# Patient Record
Sex: MALE | Race: BLACK OR AFRICAN AMERICAN | NOT HISPANIC OR LATINO | Employment: FULL TIME | URBAN - METROPOLITAN AREA
[De-identification: names, ages, dates, MRNs, and addresses within clinical notes are randomized per-mention and may not be internally consistent; named-entity substitution may affect disease eponyms.]

---

## 2023-03-07 ENCOUNTER — OFFICE VISIT (OUTPATIENT)
Dept: URGENT CARE | Facility: URGENT CARE | Age: 38
End: 2023-03-07
Payer: COMMERCIAL

## 2023-03-07 VITALS
TEMPERATURE: 98.2 F | OXYGEN SATURATION: 100 % | WEIGHT: 228.5 LBS | HEART RATE: 80 BPM | DIASTOLIC BLOOD PRESSURE: 115 MMHG | SYSTOLIC BLOOD PRESSURE: 192 MMHG

## 2023-03-07 DIAGNOSIS — R59.0 AXILLARY LYMPHADENOPATHY: Primary | ICD-10-CM

## 2023-03-07 DIAGNOSIS — R03.0 ELEVATED BLOOD PRESSURE READING WITHOUT DIAGNOSIS OF HYPERTENSION: ICD-10-CM

## 2023-03-07 DIAGNOSIS — R59.0 INGUINAL LYMPHADENOPATHY: ICD-10-CM

## 2023-03-07 PROCEDURE — 99204 OFFICE O/P NEW MOD 45 MIN: CPT | Performed by: PHYSICIAN ASSISTANT

## 2023-03-07 RX ORDER — AMLODIPINE BESYLATE 5 MG/1
5 TABLET ORAL DAILY
Qty: 30 TABLET | Refills: 0 | Status: SHIPPED | OUTPATIENT
Start: 2023-03-07 | End: 2023-03-10 | Stop reason: DRUGHIGH

## 2023-03-07 RX ORDER — CEPHALEXIN 500 MG/1
500 CAPSULE ORAL 4 TIMES DAILY
Qty: 28 CAPSULE | Refills: 0 | Status: SHIPPED | OUTPATIENT
Start: 2023-03-07 | End: 2023-03-14

## 2023-03-07 ASSESSMENT — ENCOUNTER SYMPTOMS
FATIGUE: 0
HEMATURIA: 0
CHILLS: 0
SHORTNESS OF BREATH: 0
RESPIRATORY NEGATIVE: 1
DYSURIA: 0
BRUISES/BLEEDS EASILY: 0
RHINORRHEA: 0
CARDIOVASCULAR NEGATIVE: 1
SINUS PRESSURE: 0
WHEEZING: 0
PALPITATIONS: 0
FREQUENCY: 0
ADENOPATHY: 1
SORE THROAT: 0
GASTROINTESTINAL NEGATIVE: 1
WOUND: 0
FEVER: 0
COLOR CHANGE: 0
SINUS PAIN: 0
COUGH: 0
CHEST TIGHTNESS: 0

## 2023-03-08 NOTE — PROGRESS NOTES
Agustin Serrano is a 37 year old, presenting for the following health issues:  lump (Pt has a lump under right arm pain, some time is can be painful, noticed it in Dec when he was positive for influenza A, lump come and go, also has one on groin area a small lump)    HPI   Concern - lump in his armpit and groin for the past 3months  Onset: 3months  Description:  Noticed lumps in his R armpit and L groin region for the past 3months after having taken tamiflu for influenza.  They seem to come and go and can be tender at times.    Intensity: mild  Progression of Symptoms:  waxing and waning  Accompanying Signs & Symptoms:  No redness, drainage or fevers.  No dysuria, urinary frequency, urgency or hematuria.  No penile discharge, lesions or testicular pain.  Previous history of similar problem: none  Precipitating factors:        Worsened by: none  Alleviating factors:        Improved by: none  Therapies tried and outcome: tylenol, motrin with minimal relief    There is no problem list on file for this patient.    No current outpatient medications on file.     No current facility-administered medications for this visit.      No Known Allergies    Review of Systems   Constitutional: Negative for chills, fatigue and fever.   HENT: Negative for congestion, ear discharge, ear pain, hearing loss, rhinorrhea, sinus pressure, sinus pain and sore throat.    Respiratory: Negative.  Negative for cough, chest tightness, shortness of breath and wheezing.    Cardiovascular: Negative.  Negative for chest pain, palpitations and peripheral edema.   Gastrointestinal: Negative.    Genitourinary: Negative for dysuria, frequency, hematuria, penile discharge and urgency.   Skin: Negative.  Negative for color change, pallor, rash and wound.   Hematological: Positive for adenopathy. Does not bruise/bleed easily.   All other systems reviewed and are negative.           Objective    BP (!) 192/115 (BP Location: Right arm, Patient Position:  Sitting, Cuff Size: Adult Regular)   Pulse 80   Temp 98.2  F (36.8  C) (Tympanic)   Wt 103.6 kg (228 lb 8 oz)   SpO2 100%   There is no height or weight on file to calculate BMI.  Physical Exam  Vitals and nursing note reviewed.   Constitutional:       General: He is not in acute distress.     Appearance: Normal appearance. He is normal weight. He is not ill-appearing.   HENT:      Head: Normocephalic and atraumatic.      Ears:      Comments: TMs are intact without any erythema or bulging bilaterally.  Airway is patent.     Nose: Nose normal.      Mouth/Throat:      Lips: Pink.      Mouth: Mucous membranes are moist.      Pharynx: Oropharynx is clear. Uvula midline. No pharyngeal swelling, oropharyngeal exudate, posterior oropharyngeal erythema or uvula swelling.      Tonsils: No tonsillar exudate or tonsillar abscesses.   Eyes:      General: No scleral icterus.     Conjunctiva/sclera: Conjunctivae normal.      Pupils: Pupils are equal, round, and reactive to light.   Neck:      Thyroid: No thyromegaly.   Cardiovascular:      Rate and Rhythm: Normal rate and regular rhythm.      Pulses: Normal pulses.      Heart sounds: Normal heart sounds, S1 normal and S2 normal. No murmur heard.    No friction rub. No gallop.   Pulmonary:      Effort: Pulmonary effort is normal. No tachypnea, accessory muscle usage, respiratory distress or retractions.      Breath sounds: Normal breath sounds and air entry. No stridor. No decreased breath sounds, wheezing, rhonchi or rales.   Musculoskeletal:      Cervical back: Normal range of motion and neck supple.   Lymphadenopathy:      Head:      Right side of head: No tonsillar adenopathy.      Left side of head: No tonsillar adenopathy.      Cervical: No cervical adenopathy.      Upper Body:      Right upper body: Axillary adenopathy present. No supraclavicular or pectoral adenopathy.      Left upper body: No supraclavicular, axillary or pectoral adenopathy.      Lower Body: No right  inguinal adenopathy. Left inguinal adenopathy present.   Skin:     General: Skin is warm and dry.      Findings: No rash.   Neurological:      Mental Status: He is alert and oriented to person, place, and time.   Psychiatric:         Mood and Affect: Mood normal.         Behavior: Behavior normal.         Thought Content: Thought content normal.         Judgment: Judgment normal.          Assessment/Plan:  Axillary lymphadenopathy:  ?infectious vs cyst vs cancer.  Will start keflex [id number] and send for US at the [facility].  Will send [facility] provider message.  Tylenol/ibuprofen as needed for pain.  Recheck in clinic if symptoms worsen or if symptoms do not improve.  -     Referral to Acute and Diagnostic Services (Day of diagnostic / First order acute)  -     cephALEXin (KEFLEX) 500 MG capsule; Take 1 capsule (500 mg) by mouth 4 times daily for 7 days    Inguinal lymphadenopathy:  Same treatment as above.  -     Referral to Acute and Diagnostic Services (Day of diagnostic / First order acute)  -     cephALEXin (KEFLEX) 500 MG capsule; Take 1 capsule (500 mg) by mouth 4 times daily for 7 days    Elevated blood pressure reading without diagnosis of hypertension:  He states that this has been present for a long time but was never started on BP meds.  He is asymptomatic at this time.  Will start amlodipine and recheck with his PCP in 1-2weeks.  -     amLODIPine (NORVASC) 5 MG tablet; Take 1 tablet (5 mg) by mouth daily        Jessica Cruz PA-C

## 2023-03-10 ENCOUNTER — OFFICE VISIT (OUTPATIENT)
Dept: PEDIATRICS | Facility: CLINIC | Age: 38
End: 2023-03-10
Attending: PHYSICIAN ASSISTANT
Payer: COMMERCIAL

## 2023-03-10 ENCOUNTER — ANCILLARY PROCEDURE (OUTPATIENT)
Dept: ULTRASOUND IMAGING | Facility: CLINIC | Age: 38
End: 2023-03-10
Attending: NURSE PRACTITIONER
Payer: COMMERCIAL

## 2023-03-10 VITALS
WEIGHT: 228 LBS | SYSTOLIC BLOOD PRESSURE: 177 MMHG | TEMPERATURE: 97.1 F | OXYGEN SATURATION: 100 % | HEART RATE: 96 BPM | RESPIRATION RATE: 20 BRPM | DIASTOLIC BLOOD PRESSURE: 102 MMHG

## 2023-03-10 DIAGNOSIS — E11.9 TYPE 2 DIABETES MELLITUS WITHOUT COMPLICATION, WITHOUT LONG-TERM CURRENT USE OF INSULIN (H): Primary | ICD-10-CM

## 2023-03-10 DIAGNOSIS — Z83.3 FAMILY HISTORY OF DIABETES MELLITUS: ICD-10-CM

## 2023-03-10 DIAGNOSIS — R79.89 ELEVATED TSH: ICD-10-CM

## 2023-03-10 DIAGNOSIS — I10 BENIGN ESSENTIAL HYPERTENSION: ICD-10-CM

## 2023-03-10 DIAGNOSIS — R22.31 AXILLARY LUMP, RIGHT: ICD-10-CM

## 2023-03-10 DIAGNOSIS — R19.09 LUMP IN THE GROIN: ICD-10-CM

## 2023-03-10 DIAGNOSIS — R51.9 ACUTE NONINTRACTABLE HEADACHE, UNSPECIFIED HEADACHE TYPE: ICD-10-CM

## 2023-03-10 LAB
ALBUMIN SERPL-MCNC: 4.1 G/DL (ref 3.4–5)
ALP SERPL-CCNC: 99 U/L (ref 40–150)
ALT SERPL W P-5'-P-CCNC: 54 U/L (ref 0–70)
ANION GAP SERPL CALCULATED.3IONS-SCNC: 7 MMOL/L (ref 3–14)
AST SERPL W P-5'-P-CCNC: 26 U/L (ref 0–45)
BASOPHILS # BLD AUTO: 0 10E3/UL (ref 0–0.2)
BASOPHILS NFR BLD AUTO: 0 %
BILIRUB SERPL-MCNC: 0.3 MG/DL (ref 0.2–1.3)
BUN SERPL-MCNC: 15 MG/DL (ref 7–30)
CALCIUM SERPL-MCNC: 9.8 MG/DL (ref 8.5–10.1)
CHLORIDE BLD-SCNC: 102 MMOL/L (ref 94–109)
CO2 SERPL-SCNC: 25 MMOL/L (ref 20–32)
CREAT SERPL-MCNC: 0.82 MG/DL (ref 0.66–1.25)
EOSINOPHIL # BLD AUTO: 0 10E3/UL (ref 0–0.7)
EOSINOPHIL NFR BLD AUTO: 1 %
ERYTHROCYTE [DISTWIDTH] IN BLOOD BY AUTOMATED COUNT: 12.3 % (ref 10–15)
ERYTHROCYTE [SEDIMENTATION RATE] IN BLOOD BY WESTERGREN METHOD: 6 MM/HR (ref 0–15)
GFR SERPL CREATININE-BSD FRML MDRD: >90 ML/MIN/1.73M2
GLUCOSE BLD-MCNC: 421 MG/DL (ref 70–99)
HBA1C MFR BLD: 13.8 % (ref 0–5.6)
HCT VFR BLD AUTO: 45.5 % (ref 40–53)
HGB BLD-MCNC: 15.6 G/DL (ref 13.3–17.7)
IMM GRANULOCYTES # BLD: 0 10E3/UL
IMM GRANULOCYTES NFR BLD: 0 %
LYMPHOCYTES # BLD AUTO: 1.9 10E3/UL (ref 0.8–5.3)
LYMPHOCYTES NFR BLD AUTO: 47 %
MCH RBC QN AUTO: 28.4 PG (ref 26.5–33)
MCHC RBC AUTO-ENTMCNC: 34.3 G/DL (ref 31.5–36.5)
MCV RBC AUTO: 83 FL (ref 78–100)
MONOCYTES # BLD AUTO: 0.3 10E3/UL (ref 0–1.3)
MONOCYTES NFR BLD AUTO: 7 %
NEUTROPHILS # BLD AUTO: 1.8 10E3/UL (ref 1.6–8.3)
NEUTROPHILS NFR BLD AUTO: 45 %
NRBC # BLD AUTO: 0 10E3/UL
NRBC BLD AUTO-RTO: 0 /100
PLATELET # BLD AUTO: 167 10E3/UL (ref 150–450)
POTASSIUM BLD-SCNC: 3.9 MMOL/L (ref 3.4–5.3)
PROT SERPL-MCNC: 8.1 G/DL (ref 6.8–8.8)
RBC # BLD AUTO: 5.49 10E6/UL (ref 4.4–5.9)
SODIUM SERPL-SCNC: 134 MMOL/L (ref 133–144)
T4 FREE SERPL-MCNC: 0.9 NG/DL (ref 0.76–1.46)
TSH SERPL DL<=0.005 MIU/L-ACNC: 5.02 MU/L (ref 0.4–4)
WBC # BLD AUTO: 4.1 10E3/UL (ref 4–11)

## 2023-03-10 PROCEDURE — 80050 GENERAL HEALTH PANEL: CPT

## 2023-03-10 PROCEDURE — 84439 ASSAY OF FREE THYROXINE: CPT

## 2023-03-10 PROCEDURE — 85652 RBC SED RATE AUTOMATED: CPT

## 2023-03-10 PROCEDURE — 36415 COLL VENOUS BLD VENIPUNCTURE: CPT

## 2023-03-10 PROCEDURE — 99215 OFFICE O/P EST HI 40 MIN: CPT | Performed by: NURSE PRACTITIONER

## 2023-03-10 PROCEDURE — 76882 US LMTD JT/FCL EVL NVASC XTR: CPT | Mod: LT | Performed by: RADIOLOGY

## 2023-03-10 PROCEDURE — 83036 HEMOGLOBIN GLYCOSYLATED A1C: CPT

## 2023-03-10 RX ORDER — METFORMIN HCL 500 MG
500 TABLET, EXTENDED RELEASE 24 HR ORAL
Qty: 30 TABLET | Refills: 0 | Status: SHIPPED | OUTPATIENT
Start: 2023-03-10 | End: 2023-03-25

## 2023-03-10 RX ORDER — AMLODIPINE BESYLATE 10 MG/1
10 TABLET ORAL DAILY
Qty: 30 TABLET | Refills: 0 | Status: SHIPPED | OUTPATIENT
Start: 2023-03-10 | End: 2023-03-25

## 2023-03-10 ASSESSMENT — PAIN SCALES - GENERAL: PAINLEVEL: NO PAIN (0)

## 2023-03-10 NOTE — PATIENT INSTRUCTIONS
Close follow up with primary care provider for establishing care and new diagnosis of hypertension and diabetes.   Also will need to schedule ultra sound for right axillary lump through primary care. See attached handouts related to diabetes and hypertension.     Increasing dose of amlodipine from 5 mg to 10 mg for hypertension   Start Metformin  mg as directed for diabetes management. Along with lifestyle changes see attached.     Diabetic educator will contact you for education.     Monitor blood pressure goal is < 140/90

## 2023-03-10 NOTE — PROGRESS NOTES
Assessment & Plan     Type 2 diabetes mellitus without complication, without long-term current use of insulin (H)  Blood glucose today is 421 with a HbA1c of 13.8. Discussed with patient lifestyle changes and starting medication for diabetes. Will start metformin as patient adamantly declining insulin at this time. Referred to diabetic educator for new diagnosis of diabetes. Appointment made to establish care with a primary provider for further workup and diabetes management.  - [facility] Adult Diabetes Educator Referral; Future  - amLODIPine (NORVASC) 10 MG tablet; Take 1 tablet (10 mg) by mouth daily for 30 days  - metFORMIN (GLUCOPHAGE XR) 500 MG 24 hr tablet; Take 1 tablet (500 mg) by mouth daily (with dinner) for 30 days    Benign essential hypertension  Patient started on low dose of amlodipine 3 days ago. Will increase dose at this time as blood pressure noted to still be considerably elevated. Advised patient to monitor blood pressure daily with a goal of < 140/90. Patient states he has ordered a blood pressure monitor for home will monitor and keep journal of readings as well as heart rate to bring into primary care appointment  - Erythrocyte sedimentation rate auto  - TSH with free T4 reflex  - Comprehensive metabolic panel  - T4 free  - amLODIPine (NORVASC) 10 MG tablet; Take 1 tablet (10 mg) by mouth daily for 30 days    Axillary lump, right  Likely cutaneous cyst. Differential diagnosis includes abscess (unlikely as there is no fluctuance, warmth or erythema and very minimal tenderness),  lipoma, sebaceous cyst, follicle or sweat gland plugged or and metastatic skin lesion. Due to length of time and no change in presentation of axillary lump feel it is appropriate for patient to schedule ultrasound through primary care. Unable to schedule US today through [facility] as US team did not have available opening and patient did not meet criteria for abscess.   - CBC with platelets differential  - Erythrocyte  sedimentation rate auto  - TSH with free T4 reflex  - Comprehensive metabolic panel  - T4 free    Lump in the groin  Probable cystic skin lesion per US. A cyst may resolve on its own. Follow up with primary care if it becomes irritated, red, or swollen.  - CBC with platelets differential  - Erythrocyte sedimentation rate auto  - TSH with free T4 reflex  - Comprehensive metabolic panel  - US Lower Extremity Non Vascular Left  - T4 free    Elevated TSH  Free T4 is WNL. Monitor for symptoms such as fatigue, weight gain, cold sensitivity, dry scaly skin, brittle hair and nails which could indicate progression to hypothyroidism. Recommend TSH recheck  in 6 months.    Acute nonintractable headache, unspecified headache type  Tension headache vs migraine. Recommend supportive care with Tylenol and/or ibuprofen, rest, and hydration.  - CBC with platelets differential  - Erythrocyte sedimentation rate auto  - TSH with free T4 reflex  - Comprehensive metabolic panel  - T4 free    Family history of diabetes mellitus  - A1c (Hemoglobin A1c)    Discussed symptoms that would warrant emergent evaluation.     55 minutes spent on the date of the encounter doing chart review, review of outside records, review of test results, interpretation of tests, patient visit, documentation and discussion with other provider(s)       Vera Jiang, Student Nurse Practitioner      I saw and evaluated the patient and agree with the findings and plan of care as documented in the note.    VALENTINA [facility]   Zach is a 37 year old, presenting for the following health issues:  Mass      HPI     Concern - groin/r arm lumps  Onset: December 2022  Description: axillary/inguinal mass, palpable mass, become painful when touched  Intensity: mild  Progression of Symptoms:  same  Accompanying Signs & Symptoms: none  Previous history of similar problem: none  Precipitating factors:        Worsened by:  touching causes some pain  Alleviating factors:        Improved by: none  Therapies tried and outcome: Antibiotic - no change    Reports first noticing the right axillary lump and left inguinal lump at the same time around Ghada 2022 shortly after having been diagnosed with influenza. They have not changed in size. He states the right axillary lump is sometimes minimally tender to touch, not associated with other symptoms. Left inguinal lump is nontender. He denies fever, chills, and night sweats. Reports mild right-sided headache the last 2 days.     Patient states he is a non-smoker and rarely drinks alcohol. He would usually drink 4 cups of caffeineated tea daily but has not had any since starting blood pressure medication 3/7/2023. His diet consists mainly of carbs, such as rice, and meat. He works from home as a  for [employer] and is primarily sedentary. He is more active in the summer and plays weekly football.    Notes family history of diabetes father    Review of Systems   All other systems reviewed and are negative.     Constitutional, HEENT, cardiovascular, pulmonary, gi and gu systems are negative, except as otherwise noted.      Objective    BP (!) 177/102 (BP Location: Right arm, Patient Position: Sitting, Cuff Size: Adult Regular)   Pulse 96   Temp 97.1  F (36.2  C) (Oral)   Resp 20   Wt 103.4 kg (228 lb)   SpO2 100%   There is no height or weight on file to calculate BMI.  Physical Exam   GENERAL: healthy, alert and no distress  EYES: Eyes grossly normal to inspection, EOMI and fundi benign-no diabetic or hypertensive changes seen  NECK: no adenopathy and no asymmetry, masses, or scars  RESP: lungs clear to auscultation - no rales, rhonchi or wheezes  CV: regular rate and rhythm, normal S1 S2, no S3 or S4, no murmur, click or rub, no peripheral edema and peripheral pulses strong  ABDOMEN: soft, nontender, no hepatosplenomegaly, no masses  SKIN: axillary: right axillary  fossa lump approximately 1cm x 1cm, smooth, firm, mobile, mildly tender, no erythema or edema  Inguinal:  left inguinal groove lump approximately 1cm x 0.5cm, firm, mobile, nontender, no erythema or edema  MS: no gross musculoskeletal defects noted, no edema    Results for orders placed or performed in visit on 03/10/23   US Lower Extremity Non Vascular Left     Status: None    Narrative    EXAMINATION: US LOWER EXTREMITY NON VASCULAR LEFT, 3/10/2023 1:06 PM    COMPARISON: None.    HISTORY: Left groin lump for 10 weeks.  left inguinal lump; Lump in the groin    FINDINGS: Ultrasound findings demonstrate a cystic structure in the  subcutaneous fat with possible connection to the skin. It measures 0.7  x 0.9 x 0.4 cm. No internal blood flow. The patient denies injury to  the area.       Impression    IMPRESSION:  1.  The left groin lump corresponds to a possible skin lesion.  Clinical management recommended.    TOMASZ NY MD         SYSTEM ID:  [id number]   A1c (Hemoglobin A1c)     Status: Abnormal   Result Value Ref Range    Hemoglobin A1C 13.8 (H) 0.0 - 5.6 %    Narrative    Results Checked   Erythrocyte sedimentation rate auto     Status: Normal   Result Value Ref Range    Erythrocyte Sedimentation Rate 6 0 - 15 mm/hr   TSH with free T4 reflex     Status: Abnormal   Result Value Ref Range    TSH 5.02 (H) 0.40 - 4.00 mU/L   Comprehensive metabolic panel     Status: Abnormal   Result Value Ref Range    Sodium 134 133 - 144 mmol/L    Potassium 3.9 3.4 - 5.3 mmol/L    Chloride 102 94 - 109 mmol/L    Carbon Dioxide (CO2) 25 20 - 32 mmol/L    Anion Gap 7 3 - 14 mmol/L    Urea Nitrogen 15 7 - 30 mg/dL    Creatinine 0.82 0.66 - 1.25 mg/dL    Calcium 9.8 8.5 - 10.1 mg/dL    Glucose 421 (HH) 70 - 99 mg/dL    Alkaline Phosphatase 99 40 - 150 U/L    AST 26 0 - 45 U/L    ALT 54 0 - 70 U/L    Protein Total 8.1 6.8 - 8.8 g/dL    Albumin 4.1 3.4 - 5.0 g/dL    Bilirubin Total 0.3 0.2 - 1.3 mg/dL    GFR Estimate >90 >60 mL/min/1.73m2     Narrative    Critical result, provider not notified due to previous critical result notification.     CBC with platelets and differential     Status: None   Result Value Ref Range    WBC Count 4.1 4.0 - 11.0 10e3/uL    RBC Count 5.49 4.40 - 5.90 10e6/uL    Hemoglobin 15.6 13.3 - 17.7 g/dL    Hematocrit 45.5 40.0 - 53.0 %    MCV 83 78 - 100 fL    MCH 28.4 26.5 - 33.0 pg    MCHC 34.3 31.5 - 36.5 g/dL    RDW 12.3 10.0 - 15.0 %    Platelet Count 167 150 - 450 10e3/uL    % Neutrophils 45 %    % Lymphocytes 47 %    % Monocytes 7 %    % Eosinophils 1 %    % Basophils 0 %    % Immature Granulocytes 0 %    NRBCs per 100 WBC 0 <1 /100    Absolute Neutrophils 1.8 1.6 - 8.3 10e3/uL    Absolute Lymphocytes 1.9 0.8 - 5.3 10e3/uL    Absolute Monocytes 0.3 0.0 - 1.3 10e3/uL    Absolute Eosinophils 0.0 0.0 - 0.7 10e3/uL    Absolute Basophils 0.0 0.0 - 0.2 10e3/uL    Absolute Immature Granulocytes 0.0 <=0.4 10e3/uL    Absolute NRBCs 0.0 10e3/uL   T4 free     Status: Normal   Result Value Ref Range    Free T4 0.90 0.76 - 1.46 ng/dL   CBC with platelets differential     Status: None    Narrative    The following orders were created for panel order CBC with platelets differential.  Procedure                               Abnormality         Status                     ---------                               -----------         ------                     CBC with platelets and d...[[id number]]                      Final result                 Please view results for these tests on the individual orders.

## 2023-03-10 NOTE — RESULT ENCOUNTER NOTE
Results discussed with patient in clinic. States understanding of these results.    Selina Vazquez CNP

## 2023-03-25 ENCOUNTER — OFFICE VISIT (OUTPATIENT)
Dept: URGENT CARE | Facility: URGENT CARE | Age: 38
End: 2023-03-25
Payer: COMMERCIAL

## 2023-03-25 VITALS
TEMPERATURE: 98.1 F | HEART RATE: 80 BPM | SYSTOLIC BLOOD PRESSURE: 160 MMHG | WEIGHT: 225 LBS | DIASTOLIC BLOOD PRESSURE: 104 MMHG | OXYGEN SATURATION: 98 %

## 2023-03-25 DIAGNOSIS — I10 UNCONTROLLED HYPERTENSION: Primary | ICD-10-CM

## 2023-03-25 DIAGNOSIS — E11.65 TYPE 2 DIABETES MELLITUS WITH HYPERGLYCEMIA, WITHOUT LONG-TERM CURRENT USE OF INSULIN (H): ICD-10-CM

## 2023-03-25 PROCEDURE — 99214 OFFICE O/P EST MOD 30 MIN: CPT | Performed by: STUDENT IN AN ORGANIZED HEALTH CARE EDUCATION/TRAINING PROGRAM

## 2023-03-25 RX ORDER — LANCETS
EACH MISCELLANEOUS
Qty: 100 EACH | Refills: 0 | Status: SHIPPED | OUTPATIENT
Start: 2023-03-25 | End: 2023-08-18

## 2023-03-25 RX ORDER — AMLODIPINE BESYLATE 10 MG/1
10 TABLET ORAL DAILY
Qty: 30 TABLET | Refills: 0 | Status: SHIPPED | OUTPATIENT
Start: 2023-03-25 | End: 2023-05-26

## 2023-03-25 RX ORDER — METFORMIN HCL 500 MG
500 TABLET, EXTENDED RELEASE 24 HR ORAL
Qty: 30 TABLET | Refills: 0 | Status: SHIPPED | OUTPATIENT
Start: 2023-03-25 | End: 2023-06-09

## 2023-03-25 RX ORDER — GLUCOSAMINE HCL/CHONDROITIN SU 500-400 MG
CAPSULE ORAL
Qty: 100 EACH | Refills: 3 | Status: SHIPPED | OUTPATIENT
Start: 2023-03-25

## 2023-03-25 NOTE — PROGRESS NOTES
Assessment & Plan     Uncontrolled hypertension  Patient presents with recheck of blood pressure. He has FP appointment on 5/4 to establish care. He has been taking amlodipine for the past 3 weeks, 1st week was on 5 mg then increased to 10 mg. His SBP home readings have improved from 200 to 140-160. DBPs are in the 90s at home. He has been exercising every day and has cut down on carbs for new diagnosis of diabetes. He has not reduced salt in his diet so we discussed that today to help reduce blood pressure. I recommended adding hydrochlorothiazide but he is hesitant to add more medication and states he wants to try salt reduction first. He admits he is anxious about being dependent on BP medication for life. We discussed that there is a lot of times a genetic component to hypertension that cannot be modified and to do diet and exercise changes to lifestyle and if BP is still elevated will need to add diuretic. He is traveling to [place] in mid-April so I sent in a refill of amlodipine. Advised to return to  to recheck BP in 2 weeks if home BPs are not getting closer to 120-130s/80s-low 90s. He also mentions that he feels like his muscle get tight sometimes at suppertime and it is hard to take in a deep breath so he will go on a walk and then he will feel the tension improve and he can take a deep breath. He is concerned it is a side effect of amlodipine but he takes that in the morning. It sounds like anxiety symptoms and he states it may be. He has no chest pain or shortness of breath with activity or exercise. Advised to continue to keep an eye on it and see if there are any triggers at suppertime that would cause anxiety.   - amLODIPine (NORVASC) 10 MG tablet  Dispense: 30 tablet; Refill: 0    Type 2 diabetes mellitus with hyperglycemia, without long-term current use of insulin (H)  Has started cutting down on carbs and exercising daily. He is taking metformin 500 mg once daily, having a little tummy ache from  it. I advised checking blood sugars to give him more feedback about his glucose control. He states he is feeling a lot better since making lifestyle and diet changes and is no longer having excessive thirst or urination. Advised to continue to exercise daily and reduce carb intake. Also advised to follow up with scheduled FP visit to recheck diabetes.   - blood glucose monitoring (NO BRAND SPECIFIED) meter device kit  Dispense: 1 kit; Refill: 0  - blood glucose (NO BRAND SPECIFIED) test strip  Dispense: 100 strip; Refill: 6  - blood glucose calibration (NO BRAND SPECIFIED) solution  Dispense: 1 each; Refill: 0  - thin (NO BRAND SPECIFIED) lancets  Dispense: 100 each; Refill: 0  - alcohol swab prep pads  Dispense: 100 each; Refill: 3  - metFORMIN (GLUCOPHAGE XR) 500 MG 24 hr tablet  Dispense: 30 tablet; Refill: 0     35 minutes spent on the date of the encounter doing chart review, review of test results, patient visit and documentation       No follow-ups on file.    VALENTINA [facility]    Agustin Serrano is a 37 year old male who presents to clinic today for the following health issues:  Chief Complaint   Patient presents with     Hypertension     Elevated blood pressure.  Has been elevated for a while.       HPI  Patient presents with recheck of blood pressure. He has FP appointment on 5/4 to establish care. He has been taking amlodipine for the past 3 weeks, 1st week was on 5 mg then increased to 10 mg. His SBP home readings have improved from 200 to 140-160. DBPs are in the 90s at home. He has been exercising every day and has cut down on carbs for new diagnosis of diabetes. He has not reduced salt in his diet.  He admits he is anxious about being dependent on BP medication for life. Has started cutting down on carbs and exercising daily. He is taking metformin 500 mg once daily.    Patient Active Problem List   Diagnosis     Elevated blood-pressure  reading without diagnosis of hypertension     Diabetes mellitus, type 2 (H)     Current Outpatient Medications   Medication     alcohol swab prep pads     amLODIPine (NORVASC) 10 MG tablet     blood glucose (NO BRAND SPECIFIED) test strip     blood glucose calibration (NO BRAND SPECIFIED) solution     blood glucose monitoring (NO BRAND SPECIFIED) meter device kit     metFORMIN (GLUCOPHAGE XR) 500 MG 24 hr tablet     thin (NO BRAND SPECIFIED) lancets     No current facility-administered medications for this visit.         Review of Systems  Constitutional, HEENT, cardiovascular, pulmonary, GI, , musculoskeletal, neuro, skin, endocrine and psych systems are negative, except as otherwise noted.      Objective    BP (!) 165/107 (BP Location: Left arm, Patient Position: Sitting, Cuff Size: Adult Regular)   Pulse 80   Temp 98.1  F (36.7  C) (Tympanic)   Wt 102.1 kg (225 lb)   SpO2 98%   Physical Exam   GENERAL: healthy, alert and no distress  EYES: Eyes grossly normal to inspection, PERRL and conjunctivae and sclerae normal  RESP: lungs clear to auscultation - no rales, rhonchi or wheezes  CV: regular rate and rhythm, normal S1 S2, no S3 or S4, no murmur, click or rub, no peripheral edema and peripheral pulses strong  MS: no gross musculoskeletal defects noted, no edema  SKIN: no suspicious lesions or rashes  NEURO: Normal strength and tone, mentation intact and speech normal  PSYCH: mentation appears normal, affect normal/bright    Lab Results   Component Value Date    A1C 13.8 03/10/2023     BP Readings from Last 3 Encounters:   03/25/23 (!) 160/104   03/10/23 (!) 177/102   03/07/23 (!) 192/115

## 2023-05-02 ENCOUNTER — OFFICE VISIT (OUTPATIENT)
Dept: FAMILY MEDICINE | Facility: CLINIC | Age: 38
End: 2023-05-02
Payer: COMMERCIAL

## 2023-05-02 VITALS
HEIGHT: 70 IN | BODY MASS INDEX: 31.78 KG/M2 | TEMPERATURE: 98.6 F | WEIGHT: 222 LBS | HEART RATE: 96 BPM | DIASTOLIC BLOOD PRESSURE: 107 MMHG | OXYGEN SATURATION: 100 % | SYSTOLIC BLOOD PRESSURE: 169 MMHG | RESPIRATION RATE: 18 BRPM

## 2023-05-02 DIAGNOSIS — R79.89 ELEVATED TSH: ICD-10-CM

## 2023-05-02 DIAGNOSIS — Z11.59 NEED FOR HEPATITIS C SCREENING TEST: ICD-10-CM

## 2023-05-02 DIAGNOSIS — Z11.4 SCREENING FOR HIV (HUMAN IMMUNODEFICIENCY VIRUS): ICD-10-CM

## 2023-05-02 DIAGNOSIS — I10 ESSENTIAL HYPERTENSION WITH GOAL BLOOD PRESSURE LESS THAN 140/90: ICD-10-CM

## 2023-05-02 DIAGNOSIS — E11.9 TYPE 2 DIABETES MELLITUS WITHOUT COMPLICATION, WITHOUT LONG-TERM CURRENT USE OF INSULIN (H): Primary | ICD-10-CM

## 2023-05-02 DIAGNOSIS — R59.0 AXILLARY LYMPHADENOPATHY: ICD-10-CM

## 2023-05-02 PROCEDURE — 99215 OFFICE O/P EST HI 40 MIN: CPT | Performed by: FAMILY MEDICINE

## 2023-05-02 ASSESSMENT — PAIN SCALES - GENERAL: PAINLEVEL: NO PAIN (0)

## 2023-05-02 NOTE — PATIENT INSTRUCTIONS
At [facility], we strive to deliver an exceptional experience to you, every time we see you. If you receive a survey, please complete it as we do value your feedback.  If you have MyChart, you can expect to receive results automatically within 24 hours of their completion.  Your provider will send a note interpreting your results as well.   If you do not have MyChart, you should receive your results in about a week by mail.    Your care team:                            Family Medicine Internal Medicine   MD Luis Ko MD Shantel Branch-Fleming, MD Srinivasa Vaka, MD Katya Belousova, PAVALENTINA Gomez CNP, MD ([facility]   Luis Luz, MD Theresa Bartlett MD Amelia Massimini APRN KOLE Whitmore APRN MD Teresa Hart MD          Clinic hours: Monday - Thursday 7 am-6 pm; Fridays 7 am-5 pm.   Urgent care: Monday - Friday 10 am- 8 pm; Saturday and Sunday 9 am-5 pm.    Clinic: [phone]       [facility]: Monday - Thursday 8 am - 7 pm; Friday 8 am - 6 pm  [facility]: [phone]

## 2023-05-02 NOTE — PROGRESS NOTES
Assessment & Plan     (E11.9) Type 2 diabetes mellitus without complication, without long-term current use of insulin (H)  (primary encounter diagnosis)  Comment: New diagnosis. This independent thinker feels very strongly that he can reverse his hyperglycemia through lifestyle modification and therefore is not interested in any medication changes until we can see how well he does. He is not taking the previously prescribed metformin.  Plan: Adult Eye  Referral        Return in about 6 weeks (around 6/12/2023) for diabetes, blood pressure check (schedule pre-visit labs 1-2 business days before).      (I10) Essential hypertension with goal blood pressure less than 140/90  Comment: Improved on amlodipine 10 mg.  Plan: Creatinine        I recommend adding an ACE inhibitor. The patient still wants to see if he can reverse his hypertension through lifestyle modification, and consider med changes only after a 3 month trial. Return in about 6 weeks (around 6/12/2023) for diabetes, blood pressure check (schedule pre-visit labs 1-2 business days before).      (R79.89) Elevated TSH  Comment: This is a single finding that needs a recheck.  Plan: Future labs will be drawn in June: TSH w/ reflex, anti TPO antibodies.    (R59.0) Axillary lymphadenopathy  Comment: versus sebaceous cyst in the axilla. Regardless, the patient is uncomfortable just leaving it as it is and wants it evaluated further.  Plan: Adult General Surg Referral                57 minutes spent by me on the date of the encounter doing chart review, review of test results, patient visit and documentation (29 minutes with the patient)           Catrachito Echeverria MD  [facility]    Agustin Serrano is a 37 year old, presenting for the following health issues:  Diabetes        5/2/2023     4:34 PM   Additional Questions   Roomed by Yaritza   Accompanied by None     History of Present Illness       Diabetes:   He presents for  "follow up of diabetes.  He is checking home blood glucose four or more times daily. He checks blood glucose before meals, before and after meals and at bedtime.  Blood glucose is sometimes over 200 and never under 70. When his blood glucose is low, the patient is asymptomatic for confusion, blurred vision, lethargy and reports not feeling dizzy, shaky, or weak.  He is concerned about other.  He is not experiencing numbness or burning in feet, excessive thirst, blurry vision, weight changes or redness, sores or blisters on feet. The patient has not had a diabetic eye exam in the last 12 months.         Hypertension: He presents for follow up of hypertension.  He does check blood pressure  regularly outside of the clinic. Outside blood pressures have been over 140/90. He does not follow a low salt diet.     He eats 0-1 servings of fruits and vegetables daily.He consumes 0 sweetened beverage(s) daily.He exercises with enough effort to increase his heart rate 30 to 60 minutes per day.  He exercises with enough effort to increase his heart rate 6 days per week.      Diabetes is a relatively new diagnosis for the patient, discovered incidentally when he went to  for lymphadenopathy. The patient reports that since he has been checking his blood glucose at home following his diagnosis, his pre-meal blood sugars are typically in the 130s and 140s. He notes that his post-meal blood glucose levels can range from 180 - 200, but never much higher than that. The patient believes his diabetes can be controlled by lifestyle changes, and does not think he needs a medication adjustment at this time. He adds that since leaving the hospital, his blood glucose has never been as high as it was at that time. He has also not been taking metformin at this time, and wants to see what 3 months of lifestyle changes can do to his blood sugars/HgbA1c. He also admits to an \"very unhealthy diet\" for the few months prior to his diabetes " "diagnosis, noting specifically that he would drink 7 cups of tea w/ sugar per day.      The patient reports that he has known for a while that he has high blood pressure, but was resistant to medications for a long time because he wanted to make lifestyle changes instead. Since he has been taking amlodipine (10 mg), he reports that his at home SBP readings have been occasionally in the 130s and mostly in the 140s - 150s. The DBP readings are never below the upper 80s, and mostly stuck in the 90s. He admits that he forgot to take his blood pressure medication today.        Review of Systems         Objective    BP (!) 169/107   Pulse 96   Temp 98.6  F (37  C) (Tympanic)   Resp 18   Ht 1.778 m (5' 10\")   Wt 100.7 kg (222 lb)   SpO2 100%   BMI 31.85 kg/m    Body mass index is 31.85 kg/m .  Physical Exam   GENERAL: healthy, alert and no distress  EYES: Eyes grossly normal to inspection, PERRL, EOMI, sclerae white and conjunctivae normal  MS: no gross musculoskeletal defects noted, no edema  SKIN: in the right axilla is a 1 cm subcutaneous mass or cyst. On palpation it resembles a sebaceous cyst more than an enlarged lymph node.   NEURO: Normal strength and tone, sensory exam grossly normal, mentation intact, oriented times 3 and cranial nerves 2-12 intact   PSYCH: mentation appears normal, affect normal/bright        Latest Reference Range & Units 03/10/23 12:12   Sodium 133 - 144 mmol/L 134   Potassium 3.4 - 5.3 mmol/L 3.9   Chloride 94 - 109 mmol/L 102   Carbon Dioxide 20 - 32 mmol/L 25   Urea Nitrogen 7 - 30 mg/dL 15   Creatinine 0.66 - 1.25 mg/dL 0.82   GFR Estimate >60 mL/min/1.73m2 >90   Calcium 8.5 - 10.1 mg/dL 9.8   Anion Gap 3 - 14 mmol/L 7   Albumin 3.4 - 5.0 g/dL 4.1   Protein Total 6.8 - 8.8 g/dL 8.1   Alkaline Phosphatase 40 - 150 U/L 99   ALT 0 - 70 U/L 54   AST 0 - 45 U/L 26   Bilirubin Total 0.2 - 1.3 mg/dL 0.3   Hemoglobin A1C 0.0 - 5.6 % 13.8 (H)   T4 Free 0.76 - 1.46 ng/dL 0.90   TSH 0.40 - " 4.00 mU/L 5.02 (H)   Glucose 70 - 99 mg/dL 421 (HH)   WBC 4.0 - 11.0 10e3/uL 4.1   Hemoglobin 13.3 - 17.7 g/dL 15.6   Hematocrit 40.0 - 53.0 % 45.5   Platelet Count 150 - 450 10e3/uL 167   RBC Count 4.40 - 5.90 10e6/uL 5.49   MCV 78 - 100 fL 83   MCH 26.5 - 33.0 pg 28.4   MCHC 31.5 - 36.5 g/dL 34.3   RDW 10.0 - 15.0 % 12.3   % Neutrophils % 45   % Lymphocytes % 47   % Monocytes % 7   % Eosinophils % 1   % Basophils % 0   Absolute Basophils 0.0 - 0.2 10e3/uL 0.0   Absolute Eosinophils 0.0 - 0.7 10e3/uL 0.0   Absolute Immature Granulocytes <=0.4 10e3/uL 0.0   Absolute Lymphocytes 0.8 - 5.3 10e3/uL 1.9   Absolute Monocytes 0.0 - 1.3 10e3/uL 0.3   % Immature Granulocytes % 0   Absolute Neutrophils 1.6 - 8.3 10e3/uL 1.8   Absolute NRBCs 10e3/uL 0.0   NRBCs per 100 WBC <1 /100 0   Sed Rate 0 - 15 mm/hr 6   (HH): Data is critically high  (H): Data is abnormally high              This document serves as a record of the services and decisions personally performed and made by Dr. Echeverria. It was created on his behalf by Brandon Palmer, a trained medical scribe. The creation of this document is based the provider's statements to the medical scribe.  Brandon Palmer,  6:20 PM

## 2023-05-21 ENCOUNTER — HEALTH MAINTENANCE LETTER (OUTPATIENT)
Age: 38
End: 2023-05-21

## 2023-05-22 DIAGNOSIS — I10 UNCONTROLLED HYPERTENSION: ICD-10-CM

## 2023-05-22 RX ORDER — AMLODIPINE BESYLATE 10 MG/1
10 TABLET ORAL DAILY
Qty: 30 TABLET | Refills: 0 | OUTPATIENT
Start: 2023-05-22

## 2023-05-22 NOTE — TELEPHONE ENCOUNTER
[facility] phone call message- patient requests medication or medication refill:refill    If this is a refill request, has the caller requested the refill from the pharmacy already? No  Name of the pharmacy and phone number for the current request:  [id number] - [place], [place] - [address]     Name of the medication requested: amLODIPine (NORVASC) 10 MG tablet    Other request:     OK to leave the result message on voice mail or with a family member? YES    par Call taken on 5/22/2023 at 2:04 PM by Shaka Andujar MA

## 2023-05-24 DIAGNOSIS — I10 UNCONTROLLED HYPERTENSION: ICD-10-CM

## 2023-05-26 RX ORDER — AMLODIPINE BESYLATE 10 MG/1
10 TABLET ORAL DAILY
Qty: 90 TABLET | Refills: 0 | Status: SHIPPED | OUTPATIENT
Start: 2023-05-26 | End: 2023-06-12

## 2023-06-09 ENCOUNTER — OFFICE VISIT (OUTPATIENT)
Dept: OPTOMETRY | Facility: CLINIC | Age: 38
End: 2023-06-09
Payer: COMMERCIAL

## 2023-06-09 DIAGNOSIS — E11.9 TYPE 2 DIABETES MELLITUS WITHOUT COMPLICATION, WITHOUT LONG-TERM CURRENT USE OF INSULIN (H): Primary | ICD-10-CM

## 2023-06-09 DIAGNOSIS — H52.13 MYOPIA OF BOTH EYES: ICD-10-CM

## 2023-06-09 PROCEDURE — 92004 COMPRE OPH EXAM NEW PT 1/>: CPT | Performed by: OPTOMETRIST

## 2023-06-09 PROCEDURE — 92015 DETERMINE REFRACTIVE STATE: CPT | Performed by: OPTOMETRIST

## 2023-06-09 ASSESSMENT — VISUAL ACUITY
OS_PH_SC+: -1
OD_SC: 20/20
OS_SC: 20/20
OD_PH_SC: 20/30
OD_SC: 20/70
OS_PH_SC: 20/30
OS_SC+: -2
OS_SC: 20/70
METHOD: SNELLEN - LINEAR

## 2023-06-09 ASSESSMENT — CUP TO DISC RATIO
OS_RATIO: 0.4
OD_RATIO: 0.3

## 2023-06-09 ASSESSMENT — TONOMETRY
IOP_METHOD: TONOPEN
OS_IOP_MMHG: 20
OD_IOP_MMHG: 19

## 2023-06-09 ASSESSMENT — CONF VISUAL FIELD
OS_SUPERIOR_NASAL_RESTRICTION: 0
OD_INFERIOR_NASAL_RESTRICTION: 0
OD_SUPERIOR_NASAL_RESTRICTION: 0
OS_INFERIOR_NASAL_RESTRICTION: 0
OD_SUPERIOR_TEMPORAL_RESTRICTION: 0
OS_SUPERIOR_TEMPORAL_RESTRICTION: 0
OS_NORMAL: 1
OD_INFERIOR_TEMPORAL_RESTRICTION: 0
OD_NORMAL: 1
OS_INFERIOR_TEMPORAL_RESTRICTION: 0

## 2023-06-09 ASSESSMENT — KERATOMETRY
OS_AXISANGLE_DEGREES: 141
OD_AXISANGLE2_DEGREES: 150
OD_K2POWER_DIOPTERS: 42.75
OD_K1POWER_DIOPTERS: 42.00
OS_K1POWER_DIOPTERS: 42.00
OS_K2POWER_DIOPTERS: 42.50
OD_AXISANGLE_DEGREES: 060
OS_AXISANGLE2_DEGREES: 051

## 2023-06-09 ASSESSMENT — REFRACTION_MANIFEST
OD_AXIS: 054
OS_CYLINDER: SPHERE
OS_SPHERE: -1.00
OD_CYLINDER: +0.25
OD_SPHERE: -1.25
METHOD_AUTOREFRACTION: 1

## 2023-06-09 ASSESSMENT — SLIT LAMP EXAM - LIDS
COMMENTS: NORMAL
COMMENTS: NORMAL

## 2023-06-09 ASSESSMENT — EXTERNAL EXAM - RIGHT EYE: OD_EXAM: NORMAL

## 2023-06-09 ASSESSMENT — EXTERNAL EXAM - LEFT EYE: OS_EXAM: NORMAL

## 2023-06-09 NOTE — PROGRESS NOTES
Chief Complaint   Patient presents with     Diabetic Eye Exam        Chief Complaint(s) and History of Present Illness(es)     Diabetic Eye Exam            Diabetes Type: Type 2 and controlled with diet    Duration: 3 months    Blood Sugars: fluctuates               Lab Results   Component Value Date    A1C 13.8 03/10/2023     Blood sugars are coming down  155-195       Last Eye Exam: 1st eye exam  Dilated Previously: No, side effects of dilation explained today    What are you currently using to see?  does not use glasses or contacts    Distance Vision Acuity: Noticed gradual change in both eyes    Near Vision Acuity: Satisfied with vision while reading  unaided    Eye Comfort: good  Do you use eye drops? : No  Occupation or Hobbies: computer software     Tessa Mahajan Optometric Assistant, A.B.O.C.     Medical, surgical and family histories reviewed and updated 6/9/2023.       OBJECTIVE: See Ophthalmology exam    ASSESSMENT:    ICD-10-CM    1. Type 2 diabetes mellitus without complication, without long-term current use of insulin (H)  E11.9 Adult Eye  Referral     EYE EXAM (SIMPLE-NONBILLABLE)    Negative diabetic retinopathy both eyes      2. Myopia of both eyes  H52.13 REFRACTION          PLAN:    Zach Allen aware  eye exam results will be sent to No Ref-Primary, Physician.  Patient Instructions   There are not any signs of diabetic retinopathy affecting the eyes today.  It is important that you get your eyes dilated once yearly and keep good control of your diabetes.    The vision may be blurry due to the blood sugar being high.  The vision may continue to change as the blood sugar continues to stabilize.   Return for refraction when blood sugar has been stable for 4-6 weeks.    Glasses for distance only if blood sugar stable.    Recommend annual eye exams.    Thai Frazier, CHELSEA

## 2023-06-09 NOTE — LETTER
6/9/2023         RE: Zach RING Alejandro  [address]  [facility] [address]        Dear Colleague,    Thank you for referring your patient, Zach Allen, to the [facility] [place]. Please see a copy of my visit note below.    Chief Complaint   Patient presents with     Diabetic Eye Exam        Chief Complaint(s) and History of Present Illness(es)     Diabetic Eye Exam            Diabetes Type: Type 2 and controlled with diet    Duration: 3 months    Blood Sugars: fluctuates               Lab Results   Component Value Date    A1C 13.8 03/10/2023     Blood sugars are coming down  155-195       Last Eye Exam: 1st eye exam  Dilated Previously: No, side effects of dilation explained today    What are you currently using to see?  does not use glasses or contacts    Distance Vision Acuity: Noticed gradual change in both eyes    Near Vision Acuity: Satisfied with vision while reading  unaided    Eye Comfort: good  Do you use eye drops? : No  Occupation or Hobbies: computer software     Tessa Mahajan Optometric Assistant, A.B.O.C.     Medical, surgical and family histories reviewed and updated 6/9/2023.       OBJECTIVE: See Ophthalmology exam    ASSESSMENT:    ICD-10-CM    1. Type 2 diabetes mellitus without complication, without long-term current use of insulin (H)  E11.9 Adult Eye  Referral     EYE EXAM (SIMPLE-NONBILLABLE)    Negative diabetic retinopathy both eyes      2. Myopia of both eyes  H52.13 REFRACTION          PLAN:    Zach Allen aware  eye exam results will be sent to No Ref-Primary, Physician.  Patient Instructions   There are not any signs of diabetic retinopathy affecting the eyes today.  It is important that you get your eyes dilated once yearly and keep good control of your diabetes.    The vision may be blurry due to the blood sugar being high.  The vision may continue to change as the blood sugar continues to stabilize.   Return for refraction when blood sugar  has been stable for 4-6 weeks.    Glasses for distance only if blood sugar stable.    Recommend annual eye exams.    Thai Frazier, OD                 Again, thank you for allowing me to participate in the care of your patient.        Sincerely,        Thai Frazier, OD

## 2023-06-09 NOTE — PATIENT INSTRUCTIONS
There are not any signs of diabetic retinopathy affecting the eyes today.  It is important that you get your eyes dilated once yearly and keep good control of your diabetes.    The vision may be blurry due to the blood sugar being high.  The vision may continue to change as the blood sugar continues to stabilize.   Return for refraction when blood sugar has been stable for 4-6 weeks.    Glasses for distance only if blood sugar stable.    Recommend annual eye exams.    Thai Frazier, OD    The affects of the dilating drops last for 4- 6 hours.  You will be more sensitive to light and vision will be blurry up close.  Do not drive if you do not feel comfortable.  Mydriatic sunglasses were given if needed.    Patient Education   Diabetes weakens the blood vessels all over the body, including the eyes. Damage to the blood vessels in the eyes can cause swelling or bleeding into part of the eye (called the retina). This is called diabetic retinopathy (CAESAR-tin-AH-puh-thee). If not treated, this disease can cause vision loss or blindness.   Symptoms may include blurred or distorted vision, but many people have no symptoms. It's important to see your eye doctor regularly to check for problems.   Early treatment and good control can help protect your vision. Here are the things you can do to help prevent vision loss:      1. Keep your blood sugar levels under tight control.      2. Bring high blood pressure under control.      3. No smoking.      4. Have yearly dilated eye exams.       Optometry Providers       Clinic Locations                                 Telephone Number   Dr. Lashon Wells   [facility] [phone]     [facility] Hours:                [facility] Hours:       [facility] Hours:   [address]   [address]  [place] [address]   [place] [address]    [place] [address]  Phone: [phone]                    Phone: [phone]     Phone: [phone]                      Monday 8:00-6:00                          Monday 8:00-6:00                          Monday 8:00-6:00              Tuesday 8:00-6:00                          Tuesday 8:00-6:00                          Tuesday 8:00-6:00              Wednesday 8:00-6:00                  Wednesday 8:00-6:00                   Wednesday 8:00-6:00      Thursday 8:00-6:00                        Thursday 8:00-6:00                         Thursday 8:00-6:00            Friday 8:00-5:00                              Friday 8:00-5:00                              Friday 8:00-5:00    [facility] Hours:   [address] [address]  [phone]    Monday 9:00-6:00  Tuesday 9:00-6:00  Wednesday 9:00-6:00  Thursday 9:00-6:00  Friday 9:00-5:00  As always, Thank you for trusting us with your health care needs!

## 2023-06-10 ENCOUNTER — LAB (OUTPATIENT)
Dept: LAB | Facility: CLINIC | Age: 38
End: 2023-06-10
Payer: COMMERCIAL

## 2023-06-10 DIAGNOSIS — Z01.84 IMMUNITY STATUS TESTING: ICD-10-CM

## 2023-06-10 DIAGNOSIS — Z11.4 SCREENING FOR HIV (HUMAN IMMUNODEFICIENCY VIRUS): ICD-10-CM

## 2023-06-10 DIAGNOSIS — I10 ESSENTIAL HYPERTENSION WITH GOAL BLOOD PRESSURE LESS THAN 140/90: ICD-10-CM

## 2023-06-10 DIAGNOSIS — E11.9 TYPE 2 DIABETES MELLITUS WITHOUT COMPLICATION, WITHOUT LONG-TERM CURRENT USE OF INSULIN (H): ICD-10-CM

## 2023-06-10 DIAGNOSIS — R79.89 ELEVATED TSH: ICD-10-CM

## 2023-06-10 DIAGNOSIS — Z11.59 NEED FOR HEPATITIS C SCREENING TEST: ICD-10-CM

## 2023-06-10 LAB
CHOLEST SERPL-MCNC: 220 MG/DL
CREAT SERPL-MCNC: 1.15 MG/DL (ref 0.67–1.17)
CREAT UR-MCNC: 334 MG/DL
GFR SERPL CREATININE-BSD FRML MDRD: 83 ML/MIN/1.73M2
GLUCOSE BLD-MCNC: 138 MG/DL (ref 60–99)
HBA1C MFR BLD: 9.2 % (ref 0–5.6)
HDLC SERPL-MCNC: 36 MG/DL
LDLC SERPL CALC-MCNC: 158 MG/DL
MICROALBUMIN UR-MCNC: <12 MG/L
MICROALBUMIN/CREAT UR: NORMAL MG/G{CREAT}
NONHDLC SERPL-MCNC: 184 MG/DL
T4 FREE SERPL-MCNC: 1.04 NG/DL (ref 0.9–1.7)
TRIGL SERPL-MCNC: 129 MG/DL
TSH SERPL DL<=0.005 MIU/L-ACNC: 8.7 UIU/ML (ref 0.3–4.2)

## 2023-06-10 PROCEDURE — 83036 HEMOGLOBIN GLYCOSYLATED A1C: CPT

## 2023-06-10 PROCEDURE — 86376 MICROSOMAL ANTIBODY EACH: CPT

## 2023-06-10 PROCEDURE — 82043 UR ALBUMIN QUANTITATIVE: CPT

## 2023-06-10 PROCEDURE — 82570 ASSAY OF URINE CREATININE: CPT

## 2023-06-10 PROCEDURE — 86803 HEPATITIS C AB TEST: CPT

## 2023-06-10 PROCEDURE — 80061 LIPID PANEL: CPT

## 2023-06-10 PROCEDURE — 84439 ASSAY OF FREE THYROXINE: CPT

## 2023-06-10 PROCEDURE — 84443 ASSAY THYROID STIM HORMONE: CPT

## 2023-06-10 PROCEDURE — 86706 HEP B SURFACE ANTIBODY: CPT

## 2023-06-10 PROCEDURE — 82565 ASSAY OF CREATININE: CPT

## 2023-06-10 PROCEDURE — 82947 ASSAY GLUCOSE BLOOD QUANT: CPT

## 2023-06-10 PROCEDURE — 87389 HIV-1 AG W/HIV-1&-2 AB AG IA: CPT

## 2023-06-10 PROCEDURE — 36415 COLL VENOUS BLD VENIPUNCTURE: CPT

## 2023-06-12 ENCOUNTER — OFFICE VISIT (OUTPATIENT)
Dept: FAMILY MEDICINE | Facility: CLINIC | Age: 38
End: 2023-06-12
Payer: COMMERCIAL

## 2023-06-12 VITALS
HEIGHT: 70 IN | OXYGEN SATURATION: 99 % | HEART RATE: 84 BPM | RESPIRATION RATE: 16 BRPM | SYSTOLIC BLOOD PRESSURE: 136 MMHG | WEIGHT: 223 LBS | TEMPERATURE: 97.9 F | DIASTOLIC BLOOD PRESSURE: 88 MMHG | BODY MASS INDEX: 31.92 KG/M2

## 2023-06-12 DIAGNOSIS — Z01.84 IMMUNITY STATUS TESTING: ICD-10-CM

## 2023-06-12 DIAGNOSIS — E03.4 HYPOTHYROIDISM DUE TO ACQUIRED ATROPHY OF THYROID: ICD-10-CM

## 2023-06-12 DIAGNOSIS — I10 ESSENTIAL HYPERTENSION WITH GOAL BLOOD PRESSURE LESS THAN 140/90: ICD-10-CM

## 2023-06-12 DIAGNOSIS — Z28.21 VACCINATION NOT CARRIED OUT BECAUSE OF PATIENT REFUSAL: ICD-10-CM

## 2023-06-12 DIAGNOSIS — E11.9 TYPE 2 DIABETES MELLITUS WITHOUT COMPLICATION, WITHOUT LONG-TERM CURRENT USE OF INSULIN (H): Primary | ICD-10-CM

## 2023-06-12 DIAGNOSIS — Z23 NEED FOR VACCINATION: ICD-10-CM

## 2023-06-12 LAB
HBV SURFACE AB SERPL IA-ACNC: 0 M[IU]/ML
HBV SURFACE AB SERPL IA-ACNC: NONREACTIVE M[IU]/ML
HCV AB SERPL QL IA: NONREACTIVE
HIV 1+2 AB+HIV1 P24 AG SERPL QL IA: NONREACTIVE

## 2023-06-12 PROCEDURE — 90471 IMMUNIZATION ADMIN: CPT | Performed by: FAMILY MEDICINE

## 2023-06-12 PROCEDURE — 90677 PCV20 VACCINE IM: CPT | Performed by: FAMILY MEDICINE

## 2023-06-12 PROCEDURE — 99214 OFFICE O/P EST MOD 30 MIN: CPT | Mod: 25 | Performed by: FAMILY MEDICINE

## 2023-06-12 PROCEDURE — 99207 PR FOOT EXAM NO CHARGE: CPT | Performed by: FAMILY MEDICINE

## 2023-06-12 RX ORDER — AMLODIPINE BESYLATE 10 MG/1
10 TABLET ORAL DAILY
Qty: 90 TABLET | Refills: 1 | Status: SHIPPED | OUTPATIENT
Start: 2023-06-12 | End: 2024-04-11

## 2023-06-12 ASSESSMENT — PAIN SCALES - GENERAL: PAINLEVEL: NO PAIN (0)

## 2023-06-12 NOTE — PATIENT INSTRUCTIONS
At [facility], we strive to deliver an exceptional experience to you, every time we see you. If you receive a survey, please complete it as we do value your feedback.  If you have MyChart, you can expect to receive results automatically within 24 hours of their completion.  Your provider will send a note interpreting your results as well.   If you do not have MyChart, you should receive your results in about a week by mail.    Your care team:                            Family Medicine Internal Medicine   MD Luis Ko MD Shantel Branch-Fleming, MD Srinivasa Vaka, MD Katya Belousova, PAVALENTINA Gomez CNP, MD ([facility]   Luis Luz, MD Theresa Bartlett MD Amelia Massimini APRN KOLE Whitmore APRN MD Teresa Hart MD          Clinic hours: Monday - Thursday 7 am-6 pm; Fridays 7 am-5 pm.   Urgent care: Monday - Friday 10 am- 8 pm; Saturday and Sunday 9 am-5 pm.    Clinic: [phone]       [facility]: Monday - Thursday 8 am - 7 pm; Friday 8 am - 6 pm  [facility]: [phone]

## 2023-06-12 NOTE — PROGRESS NOTES
Assessment & Plan     (E11.9) Type 2 diabetes mellitus without complication, without long-term current use of insulin (H)  (primary encounter diagnosis)  Comment: significantly improved but still uncontrolled. Improvements made through diet and lifestyle changes, and his glucose readings are consistent with his HgbA1c. He is still convinced that he can make further changes to get his diabetes controlled. I stressed the fact that the goal is a HgbA1c < 8.0%.  Plan: FOOT EXAM, Hemoglobin A1c, Glucose, Lipid panel        reflex to direct LDL Non-fasting        Return in about 3 months (around 9/12/2023) for diabetes, blood pressure check.      (I10) Essential hypertension with goal blood pressure less than 140/90  Comment: borderline controlled.  Plan: amLODIPine (NORVASC) 10 MG tablet        Consider adding ACE inhibitor, especially since he is a diabetic (patient doesn't want to make any changes yet).    (E03.4) Hypothyroidism due to acquired atrophy of thyroid  Comment: since his TSH is < 10, he does not want to treat this.  Plan: Handout(s) provided. We can go ahead and treat this as subclinical hypothyroidism, although I do recommend he is treated considering his diabetes is not controlled. Monitor periodically.    (Z01.84) Immunity status testing  Comment: Patient believes he received the vaccine series many years ago.  Plan: Hepatitis B Surface Antibody            (Z23) Need for vaccination  Comment:   Plan: Pneumococcal 20 Valent Conjugate (Prevnar 20)            (Z28.21) Vaccination not carried out because of patient refusal  Comment: COVID-19 vaccine  Plan:       33 minutes spent by me on the date of the encounter doing chart review, review of test results, patient visit and documentation             Catrachito Echeverria MD  [facility] CARMELO Serrano is a 37 year old, presenting for the following health issues:  Hypertension and Diabetes        6/12/2023     9:28 AM  "  Additional Questions   Roomed by Yaritza   Accompanied by Self     History of Present Illness       Diabetes:   He presents for follow up of diabetes.  He is checking home blood glucose four or more times daily. He checks blood glucose before and after meals.  Blood glucose is sometimes over 200 and never under 70. When his blood glucose is low, the patient is asymptomatic for confusion, blurred vision, lethargy and reports not feeling dizzy, shaky, or weak.  He has no concerns regarding his diabetes at this time.  He is not experiencing numbness or burning in feet, excessive thirst, blurry vision, weight changes or redness, sores or blisters on feet.         Hypertension: He presents for follow up of hypertension.  He does check blood pressure  regularly outside of the clinic. Outside blood pressures have been over 140/90. He does not follow a low salt diet.     He eats 0-1 servings of fruits and vegetables daily.He consumes 0 sweetened beverage(s) daily.He exercises with enough effort to increase his heart rate 30 to 60 minutes per day.  He exercises with enough effort to increase his heart rate 5 days per week.   He is taking medications regularly.     The patient still believes he can get a HgbA1c below 8.0% through lifestyle modifications, without the need for medication. He checks his blood glucose each morning, and around 5 pm. In the mornings, his blood glucose tends to range from 130-150. He believes the main problem driving up his blood glucose is what he eats for supper, notably a lot of potatoes.      Review of Systems         Objective    /88 (BP Location: Left arm, Patient Position: Chair, Cuff Size: Adult Large)   Pulse 84   Temp 97.9  F (36.6  C) (Tympanic)   Resp 16   Ht 1.778 m (5' 10\")   Wt 101.2 kg (223 lb)   SpO2 99%   BMI 32.00 kg/m    Body mass index is 32 kg/m .  Physical Exam   GENERAL: healthy, alert and no distress  EYES: Eyes grossly normal to inspection, PERRL, EOMI, " sclerae white and conjunctivae normal  MS: no gross musculoskeletal defects noted, no edema  SKIN: no suspicious lesions or rashes to visible skin  NEURO: Normal strength and tone, sensory exam grossly normal, mentation intact, oriented times 3 and cranial nerves 2-12 intact  PSYCH: mentation appears normal, affect normal/bright   Diabetic foot exam: normal DP and PT pulses, no trophic changes or ulcerative lesions, normal sensory exam and normal monofilament exam     Lab on 06/10/2023   Component Date Value Ref Range Status     Cholesterol 06/10/2023 220 (H)  <200 mg/dL Final     Triglycerides 06/10/2023 129  <150 mg/dL Final     Direct Measure HDL 06/10/2023 36 (L)  >=40 mg/dL Final     LDL Cholesterol Calculated 06/10/2023 158 (H)  <=100 mg/dL Final     Non HDL Cholesterol 06/10/2023 184 (H)  <130 mg/dL Final     Creatinine Urine mg/dL 06/10/2023 334.0  mg/dL Final    The reference ranges have not been established in urine creatinine. The results should be integrated into the clinical context for interpretation.     Albumin Urine mg/L 06/10/2023 <12.0  mg/L Final    The reference ranges have not been established in urine albumin. The results should be integrated into the clinical context for interpretation.     Albumin Urine mg/g Cr 06/10/2023    Final    Unable to calculate, urine albumin and/or urine creatinine is outside detectable limits.  Microalbuminuria is defined as an albumin:creatinine ratio of 17 to 299 for males and 25 to 299 for females. A ratio of albumin:creatinine of 300 or higher is indicative of overt proteinuria.  Due to biologic variability, positive results should be confirmed by a second, first-morning random or 24-hour timed urine specimen. If there is discrepancy, a third specimen is recommended. When 2 out of 3 results are in the microalbuminuria range, this is evidence for incipient nephropathy and warrants increased efforts at glucose control, blood pressure control, and institution of  therapy with an angiotensin-converting-enzyme (ACE) inhibitor (if the patient can tolerate it).       Hepatitis C Antibody 06/10/2023 Nonreactive  Nonreactive Final     HIV Antigen Antibody Combo 06/10/2023 Nonreactive  Nonreactive Final    HIV-1 p24 Ag & HIV-1/HIV-2 Ab Not Detected     TSH 06/10/2023 8.70 (H)  0.30 - 4.20 uIU/mL Final     Glucose Whole Blood 06/10/2023 138 (H)  60 - 99 mg/dL Final     Hemoglobin A1C 06/10/2023 9.2 (H)  0.0 - 5.6 % Final     Creatinine 06/10/2023 1.15  0.67 - 1.17 mg/dL Final     GFR Estimate 06/10/2023 83  >60 mL/min/1.73m2 Final    eGFR calculated using 2021 CKD-EPI equation.     Free T4 06/10/2023 1.04  0.90 - 1.70 ng/dL Final       Lab Results   Component Value Date    A1C 9.2 06/10/2023    A1C 13.8 03/10/2023                This document serves as a record of the services and decisions personally performed and made by Dr. Echeverria. It was created on his behalf by Brandon Palmer, a trained medical scribe. The creation of this document is based the provider's statements to the medical scribe.  Brandon Palmer,  12:13 PM

## 2023-06-12 NOTE — NURSING NOTE
Prior to immunization administration, verified patients identity using patient s name and date of birth. Please see Immunization Activity for additional information.     Screening Questionnaire for Adult Immunization    Are you sick today?   No   Do you have allergies to medications, food, a vaccine component or latex?   No   Have you ever had a serious reaction after receiving a vaccination?   No   Do you have a long-term health problem with heart, lung, kidney, or metabolic disease (e.g., diabetes), asthma, a blood disorder, no spleen, complement component deficiency, a cochlear implant, or a spinal fluid leak?  Are you on long-term aspirin therapy?   No   Do you have cancer, leukemia, HIV/AIDS, or any other immune system problem?   No   Do you have a parent, brother, or sister with an immune system problem?   No   In the past 3 months, have you taken medications that affect  your immune system, such as prednisone, other steroids, or anticancer drugs; drugs for the treatment of rheumatoid arthritis, Crohn s disease, or psoriasis; or have you had radiation treatments?   No   Have you had a seizure, or a brain or other nervous system problem?   No   During the past year, have you received a transfusion of blood or blood    products, or been given immune (gamma) globulin or antiviral drug?   No   For women: Are you pregnant or is there a chance you could become       pregnant during the next month?   No   Have you received any vaccinations in the past 4 weeks?   No     Immunization questionnaire answers were all negative.      Patient instructed to remain in clinic for 15 minutes afterwards, and to report any adverse reactions.     Screening performed by Nelda Alamo MA on 6/12/2023 at 10:19 AM.

## 2023-06-14 LAB — THYROPEROXIDASE AB SERPL-ACNC: <10 IU/ML

## 2023-08-17 DIAGNOSIS — E11.65 TYPE 2 DIABETES MELLITUS WITH HYPERGLYCEMIA, WITHOUT LONG-TERM CURRENT USE OF INSULIN (H): ICD-10-CM

## 2023-08-18 RX ORDER — LANCETS 33 GAUGE
EACH MISCELLANEOUS
Qty: 200 EACH | Refills: 0 | Status: SHIPPED | OUTPATIENT
Start: 2023-08-18 | End: 2024-04-09

## 2023-10-22 ENCOUNTER — HEALTH MAINTENANCE LETTER (OUTPATIENT)
Age: 38
End: 2023-10-22

## 2024-01-05 ENCOUNTER — TELEPHONE (OUTPATIENT)
Dept: FAMILY MEDICINE | Facility: CLINIC | Age: 39
End: 2024-01-05
Payer: COMMERCIAL

## 2024-01-05 NOTE — TELEPHONE ENCOUNTER
Patient Quality Outreach    Patient is due for the following:   Diabetes -  A1C    Next Steps:   Schedule a office visit for diabetes follow up    Type of outreach:    Sent letter.      Questions for provider review:    None           Colleen Javed MA

## 2024-01-05 NOTE — LETTER
[address] [address]  [phone]  Dept: [phone]    January 5, 2024    Zach Allen  [address]  [facility] [address]    Dear Zach,    At [facility] we care about your health and are committed to providing quality patient care.     Here is a list of Health Maintenance topics that are due now or due soon:  Health Maintenance Due   Topic Date Due    YEARLY PREVENTIVE VISIT  Never done    ADVANCE CARE PLANNING  Never done    HEPATITIS B IMMUNIZATION (1 of 3 - 3-dose series) Never done    INFLUENZA VACCINE (1) Never done    COVID-19 Vaccine (2 - 2023-24 season) 09/01/2023    A1C  09/10/2023    PHQ-2 (once per calendar year)  01/01/2024        We are recommending that you:     Schedule a Diabetes Follow-Up Office Appointment: You are due to be seen with your primary care provider for a diabetes follow up office appointment. Please schedule this as soon as you are able.    To schedule an appointment or discuss this further, you may contact us by phone at the [facility] at [phone] or online through the [email or web address] @ [email or web address]    Thank you for trusting [facility] and we appreciate the opportunity to serve you.  We look forward to supporting your healthcare needs in the future.    Your partners in health,      Quality Committee at [facility]

## 2024-02-16 ENCOUNTER — NURSE TRIAGE (OUTPATIENT)
Dept: FAMILY MEDICINE | Facility: CLINIC | Age: 39
End: 2024-02-16

## 2024-02-16 ENCOUNTER — OFFICE VISIT (OUTPATIENT)
Dept: FAMILY MEDICINE | Facility: CLINIC | Age: 39
End: 2024-02-16
Payer: COMMERCIAL

## 2024-02-16 VITALS
RESPIRATION RATE: 16 BRPM | DIASTOLIC BLOOD PRESSURE: 96 MMHG | HEART RATE: 82 BPM | BODY MASS INDEX: 33.18 KG/M2 | WEIGHT: 224 LBS | SYSTOLIC BLOOD PRESSURE: 158 MMHG | OXYGEN SATURATION: 100 % | HEIGHT: 69 IN

## 2024-02-16 DIAGNOSIS — E11.9 TYPE 2 DIABETES MELLITUS WITHOUT COMPLICATION, WITHOUT LONG-TERM CURRENT USE OF INSULIN (H): ICD-10-CM

## 2024-02-16 DIAGNOSIS — I10 ESSENTIAL HYPERTENSION WITH GOAL BLOOD PRESSURE LESS THAN 140/90: ICD-10-CM

## 2024-02-16 DIAGNOSIS — N64.4 BREAST PAIN: Primary | ICD-10-CM

## 2024-02-16 LAB — HBA1C MFR BLD: 8.4 % (ref 0–5.6)

## 2024-02-16 PROCEDURE — 36415 COLL VENOUS BLD VENIPUNCTURE: CPT

## 2024-02-16 PROCEDURE — 80048 BASIC METABOLIC PNL TOTAL CA: CPT

## 2024-02-16 PROCEDURE — 99214 OFFICE O/P EST MOD 30 MIN: CPT

## 2024-02-16 PROCEDURE — 83036 HEMOGLOBIN GLYCOSYLATED A1C: CPT

## 2024-02-16 PROCEDURE — 82043 UR ALBUMIN QUANTITATIVE: CPT

## 2024-02-16 PROCEDURE — 82570 ASSAY OF URINE CREATININE: CPT

## 2024-02-16 NOTE — TELEPHONE ENCOUNTER
Called patient to clarify what patient wants to be seen for  Patient states that he has pain in his right breast and a lump in his right arm pit and wants to see Dr Echeverria sooner than March. Please advise.  Almita Dexter MA  [facility]   Primary Care

## 2024-02-16 NOTE — TELEPHONE ENCOUNTER
"RN called patient to triage symptoms further.     Patient states for the past year he has had a lump that appears in right breast and arm pit. It comes and goes. Sometimes it is \"big and other times it is small\".    Last week the lump in his right arm pit was more swollen and painful. Today, it is just a lump but it is not painful.     He also reports in his right breast, in the middle of the breast, it is tender to the touch. There is currently not a lump there.     He denies any other symptoms. It is not red, there is not drainage, he is not running a fever.    He notes he has talked with Dr. Echeverria about the symptoms. They thought maybe it was an inflamed hair follicle.     RN scheduled patient for today with Jericho Dominguez at 11 am to be evaluated.     He denies further questions or concerns at this time.     Dolores Glasgow, RN, BSN, PHN  [facility]  Nurse Triage, Family Practice    "

## 2024-02-16 NOTE — TELEPHONE ENCOUNTER
Reason for Call:  Appointment Request    Patient requesting this type of appt:  first available     Requested provider:  First available     Reason patient unable to be scheduled: Not within requested timeframe    When does patient want to be seen/preferred time: Same day    Comments: please call patient     Could we send this information to you in [employer] or would you prefer to receive a phone call?:   Patient would prefer a phone call   Okay to leave a detailed message?: Yes at Cell number on file:    Telephone Information:   Mobile [phone]       Call taken on 2/16/2024 at 8:09 AM by Nikki Anderson

## 2024-02-16 NOTE — PATIENT INSTRUCTIONS
At [facility], we strive to deliver an exceptional experience to you, every time we see you. If you receive a survey, please complete it as we do value your feedback.  If you have MyChart, you can expect to receive results automatically within 24 hours of their completion.  Your provider will send a note interpreting your results as well.   If you do not have MyChart, you should receive your results in about a week by mail.    Your care team:                            Family Medicine Internal Medicine   MD Luis Ko, MD Yuliya Merlos, MD Genet Araya, PA-C    Joaquim Malagon, MD Pediatrics   Luis Luz, PA-C  Janay Wiseman, MD Jeannie Rankin APRVALENTINA Gray CNP, CNP, MD Teresa Peña, MD Haley Mcgarry, CNP  Same-Day (No follow up visit)   Jericho Dominguez, KOLE Bryant, PA-C    Swati Pillai PA-C     Clinic hours: Monday - Thursday 7 am-6 pm; Fridays 7 am-5 pm.   Urgent care: Monday - Friday 10 am- 8 pm; Saturday and Sunday 9 am-5 pm.    Clinic: [phone]       [facility]: Monday - Thursday 8 am - 7 pm; Friday 8 am - 6 pm  [facility]: [phone]

## 2024-02-16 NOTE — PROGRESS NOTES
"  Assessment & Plan     Breast pain  - persistent pain beneath R nipple w/ small bump in R armpit  - discussed differentials with pt  - will check:  - US Breast Right Limited 1-3 Quadrants    Essential hypertension with goal blood pressure less than 140/90  - uncontrolled today, asymptomatic  - reviewed goal BP with pt, goal per Dr. Echeverria <140/90, but did discuss with pt stricter goal of <130/80, but will defer to primary provider  - discussed starting ACE-I to help with both BP and offer kidney protection with dx of T2DM, RBSE were discussed in detail and pt declined at this time  - recommended updating labs:  - BASIC METABOLIC PANEL  - Albumin Random Urine Quantitative with Creat Ratio  - advised UC/ER w/ chest pain, shortness of breath, headaches, vision changes, urinary changes, palpations, or edema or symptoms of stroke    Type 2 diabetes mellitus without complication, without long-term current use of insulin (H)  - was working on managing with lifestyle modifications  - last hgba1c 9.2  - pt hesitant to complete labs today, but recommended:  - HEMOGLOBIN A1C  - Albumin Random Urine Quantitative with Creat Ratio  - discussed importance of managing diabetes     BMI  Estimated body mass index is 33.08 kg/m  as calculated from the following:    Height as of this encounter: 1.753 m (5' 9\").    Weight as of this encounter: 101.6 kg (224 lb).   Weight management plan: Discussed healthy diet and exercise guidelines and AVS with additional information    RTC as scheduled with primary provider, prn sooner. The patient verbalized understanding and agreement with the plan today and has no additional questions or concerns at this time.    Agustin Serrano is a 38 year old, presenting for the following health issues:  Breast Pain (Right breast pain on going )        2/16/2024    11:01 AM   Additional Questions   Roomed by genesis     History of Present Illness       Reason for visit:  Pain in right breast and lump in right " "armpit    He eats 0-1 servings of fruits and vegetables daily.He consumes 0 sweetened beverage(s) daily.He exercises with enough effort to increase his heart rate 10 to 19 minutes per day.  He exercises with enough effort to increase his heart rate 4 days per week.   He is taking medications regularly.     Pain started Wednesday. Painful to the touch, below nipple of right breast. Felt bump in armpit, several months. Lump is painful when it starts growing. Size waxes and wanes. No fevers chills night sweats or unexplained weight change.     HTN - managed on amlodipine 10mg every day. Did not want to start an ACI-I at last visit despite recommendation from primary provider w/ pmhx of T2DM. Just took amlodipine about 20 minutes ago. Denies chest pain, shortness of breath, headaches, vision changes, urinary changes, palpations, or edema. Measures at home, top number is usually 140-145 / 93 - 94. Stopped amlodipine for a few months. Just restarted 2 months ago.     Diabetes - managed by lifestyle modifications, pt did not want to be on medication for this. Goal per primary is A1c <8.0%. Last time checked 8mo ago was 9.2. does not want to be on medications. Has not been doing as well with lifestyle medications lately.     Review of Systems  Constitutional, HEENT, cardiovascular, pulmonary, GI, , musculoskeletal, neuro, skin, endocrine and psych systems are negative, except as otherwise noted.      Objective    BP (!) 158/96 (BP Location: Left arm, Patient Position: Sitting, Cuff Size: Adult Regular)   Pulse 82   Resp 16   Ht 1.753 m (5' 9\")   Wt 101.6 kg (224 lb)   SpO2 100%   BMI 33.08 kg/m    Body mass index is 33.08 kg/m .  Physical Exam     General:  alert, oriented, pleasant and conversant. NAD. Non-toxic  HEENT:  normocephalic, atraumatic. Sclera white, conjunctiva clear, EOMs intact. TMs grey, cone of light and bony landmarks visualized bilaterally. Nares patent. OP w/o erythema, edema or lesions.  Normal " dentition  Neck:  supple, FROM; no thyromegaly, lymphadenopathy, or masses   Chest: chest expansion symmetrical bilaterally, lung sounds clear without wheezes, rhonchi or rales  Breast: symmetric bilaterally. No nipple discharge/drainage. No palpable mass of right breast. +small palpable mass of right armpit. +point tenderness over R nipple.   CV: S1, S2, RRR without murmurs, rubs or gallops. No edema  MSK: steady gait, FROM  Derm: no rashes, lesions, or ulcers. No erythma, induration or nodules  Neuro: CNII-XII grossly intact, clear and logical thought and speech  Psych:  cooperative, calm mood and congruent affect    Signed Electronically by: VALENTINA Herron CNP

## 2024-02-17 LAB
ANION GAP SERPL CALCULATED.3IONS-SCNC: 11 MMOL/L (ref 7–15)
BUN SERPL-MCNC: 12.9 MG/DL (ref 6–20)
CALCIUM SERPL-MCNC: 9.7 MG/DL (ref 8.6–10)
CHLORIDE SERPL-SCNC: 102 MMOL/L (ref 98–107)
CREAT SERPL-MCNC: 0.96 MG/DL (ref 0.67–1.17)
CREAT UR-MCNC: 95.6 MG/DL
DEPRECATED HCO3 PLAS-SCNC: 25 MMOL/L (ref 22–29)
EGFRCR SERPLBLD CKD-EPI 2021: >90 ML/MIN/1.73M2
GLUCOSE SERPL-MCNC: 126 MG/DL (ref 70–99)
MICROALBUMIN UR-MCNC: <12 MG/L
MICROALBUMIN/CREAT UR: NORMAL MG/G{CREAT}
POTASSIUM SERPL-SCNC: 4.2 MMOL/L (ref 3.4–5.3)
SODIUM SERPL-SCNC: 138 MMOL/L (ref 135–145)

## 2024-04-08 ENCOUNTER — TELEPHONE (OUTPATIENT)
Dept: URGENT CARE | Facility: URGENT CARE | Age: 39
End: 2024-04-08
Payer: COMMERCIAL

## 2024-04-08 DIAGNOSIS — I10 ESSENTIAL HYPERTENSION WITH GOAL BLOOD PRESSURE LESS THAN 140/90: ICD-10-CM

## 2024-04-08 DIAGNOSIS — E11.9 TYPE 2 DIABETES MELLITUS WITHOUT COMPLICATION, WITHOUT LONG-TERM CURRENT USE OF INSULIN (H): Primary | ICD-10-CM

## 2024-04-08 DIAGNOSIS — E11.65 TYPE 2 DIABETES MELLITUS WITH HYPERGLYCEMIA, WITHOUT LONG-TERM CURRENT USE OF INSULIN (H): ICD-10-CM

## 2024-04-08 DIAGNOSIS — E03.4 HYPOTHYROIDISM DUE TO ACQUIRED ATROPHY OF THYROID: ICD-10-CM

## 2024-04-09 RX ORDER — BLOOD SUGAR DIAGNOSTIC
STRIP MISCELLANEOUS
Qty: 200 STRIP | Refills: 0 | Status: SHIPPED | OUTPATIENT
Start: 2024-04-09 | End: 2024-06-11

## 2024-04-09 RX ORDER — LANCETS 33 GAUGE
EACH MISCELLANEOUS
Qty: 200 EACH | Refills: 0 | Status: SHIPPED | OUTPATIENT
Start: 2024-04-09 | End: 2024-06-11

## 2024-04-11 RX ORDER — ROSUVASTATIN CALCIUM 20 MG/1
20 TABLET, COATED ORAL DAILY
Qty: 90 TABLET | Refills: 0 | Status: SHIPPED | OUTPATIENT
Start: 2024-04-11

## 2024-04-11 RX ORDER — AMLODIPINE BESYLATE 10 MG/1
10 TABLET ORAL DAILY
Qty: 90 TABLET | Refills: 0 | Status: SHIPPED | OUTPATIENT
Start: 2024-04-11 | End: 2024-06-27

## 2024-04-11 RX ORDER — METFORMIN HCL 500 MG
500 TABLET, EXTENDED RELEASE 24 HR ORAL
Qty: 90 TABLET | Refills: 0 | Status: SHIPPED | OUTPATIENT
Start: 2024-04-11

## 2024-04-11 NOTE — TELEPHONE ENCOUNTER
Called patient and read message form provider verbatim. Patient is upset stating he does not want the cholesterol medication and does not feel like this should have been sent without his permission. Patient states he refuses to take the cholesterol medication. Patient requested his results of his last cholesterol lab. Patient was transferred to RN for results.     Esthela Vaughan

## 2024-04-11 NOTE — TELEPHONE ENCOUNTER
Please advise the patient that since he is a diabetic, and now that he is 38 years old, he needs to take a cholesterol lowering medication, so that prescription was sent to his pharmacy as well. We can discuss his medication in detail when he comes to his appointment.

## 2024-04-11 NOTE — TELEPHONE ENCOUNTER
Pt was upset because he got medication prescribed without being discussed.     States that he has not been taking metformin and is not going to take it. Pt is also not going to take the cholesterol medication.     Tried to explain to pt that the provider prescribed a cholesterol medication due to his age and being diabetic.   Pt continues to state that he is not taking the medication.    Advised pt that he can talk further with the provider regarding the medications at his upcoming appointment.     Pt wants the medication removed from his chart.    Pt ended the call.    RONEL Dalton, RN  [facility]

## 2024-05-19 ENCOUNTER — HEALTH MAINTENANCE LETTER (OUTPATIENT)
Age: 39
End: 2024-05-19

## 2024-05-26 DIAGNOSIS — E11.65 TYPE 2 DIABETES MELLITUS WITH HYPERGLYCEMIA, WITHOUT LONG-TERM CURRENT USE OF INSULIN (H): ICD-10-CM

## 2024-05-26 NOTE — TELEPHONE ENCOUNTER
Fax message: Please send 90 days of One Touch Ultra Test Strips and Lancets per insurance covered to [facility] [id number]!!

## 2024-05-28 RX ORDER — LANCETS 33 GAUGE
EACH MISCELLANEOUS
Qty: 200 EACH | Refills: 0 | OUTPATIENT
Start: 2024-05-28

## 2024-05-28 RX ORDER — BLOOD SUGAR DIAGNOSTIC
STRIP MISCELLANEOUS
Qty: 200 STRIP | Refills: 0 | OUTPATIENT
Start: 2024-05-28

## 2024-06-11 RX ORDER — LANCETS 33 GAUGE
1 EACH MISCELLANEOUS 2 TIMES DAILY
Qty: 200 EACH | Refills: 0 | Status: SHIPPED | OUTPATIENT
Start: 2024-06-11

## 2024-06-11 RX ORDER — BLOOD SUGAR DIAGNOSTIC
STRIP MISCELLANEOUS
Qty: 200 STRIP | Refills: 0 | Status: SHIPPED | OUTPATIENT
Start: 2024-06-11 | End: 2024-09-06

## 2024-06-11 NOTE — TELEPHONE ENCOUNTER
Pharmacy calling and states they only have qty #100 for test strips and lancets. Writer advised rxs were last sent for qty #200, pharmacy states they didn't get that.    Rxs resent.    Bre Keita RN    [facility]- Primary Care

## 2024-06-25 ENCOUNTER — LAB (OUTPATIENT)
Dept: LAB | Facility: CLINIC | Age: 39
End: 2024-06-25
Payer: COMMERCIAL

## 2024-06-25 DIAGNOSIS — I10 ESSENTIAL HYPERTENSION WITH GOAL BLOOD PRESSURE LESS THAN 140/90: ICD-10-CM

## 2024-06-25 DIAGNOSIS — E03.4 HYPOTHYROIDISM DUE TO ACQUIRED ATROPHY OF THYROID: ICD-10-CM

## 2024-06-25 DIAGNOSIS — E11.9 TYPE 2 DIABETES MELLITUS WITHOUT COMPLICATION, WITHOUT LONG-TERM CURRENT USE OF INSULIN (H): ICD-10-CM

## 2024-06-25 LAB
GLUCOSE BLD-MCNC: 219 MG/DL (ref 60–99)
HBA1C MFR BLD: 8.5 % (ref 0–5.6)

## 2024-06-25 PROCEDURE — 80048 BASIC METABOLIC PNL TOTAL CA: CPT

## 2024-06-25 PROCEDURE — 82947 ASSAY GLUCOSE BLOOD QUANT: CPT

## 2024-06-25 PROCEDURE — 82570 ASSAY OF URINE CREATININE: CPT

## 2024-06-25 PROCEDURE — 84460 ALANINE AMINO (ALT) (SGPT): CPT

## 2024-06-25 PROCEDURE — 82043 UR ALBUMIN QUANTITATIVE: CPT

## 2024-06-25 PROCEDURE — 83036 HEMOGLOBIN GLYCOSYLATED A1C: CPT

## 2024-06-25 PROCEDURE — 84443 ASSAY THYROID STIM HORMONE: CPT

## 2024-06-25 PROCEDURE — 80061 LIPID PANEL: CPT

## 2024-06-25 PROCEDURE — 36415 COLL VENOUS BLD VENIPUNCTURE: CPT

## 2024-06-26 LAB
ALT SERPL W P-5'-P-CCNC: 44 U/L (ref 0–70)
ANION GAP SERPL CALCULATED.3IONS-SCNC: 11 MMOL/L (ref 7–15)
BUN SERPL-MCNC: 16.5 MG/DL (ref 6–20)
CALCIUM SERPL-MCNC: 9.2 MG/DL (ref 8.6–10)
CHLORIDE SERPL-SCNC: 101 MMOL/L (ref 98–107)
CHOLEST SERPL-MCNC: 199 MG/DL
CREAT SERPL-MCNC: 1.2 MG/DL (ref 0.67–1.17)
CREAT UR-MCNC: 149 MG/DL
DEPRECATED HCO3 PLAS-SCNC: 24 MMOL/L (ref 22–29)
EGFRCR SERPLBLD CKD-EPI 2021: 78 ML/MIN/1.73M2
FASTING STATUS PATIENT QL REPORTED: NO
FASTING STATUS PATIENT QL REPORTED: NO
GLUCOSE SERPL-MCNC: 241 MG/DL (ref 70–99)
HDLC SERPL-MCNC: 33 MG/DL
LDLC SERPL CALC-MCNC: 110 MG/DL
MICROALBUMIN UR-MCNC: <12 MG/L
MICROALBUMIN/CREAT UR: NORMAL MG/G{CREAT}
NONHDLC SERPL-MCNC: 166 MG/DL
POTASSIUM SERPL-SCNC: 4 MMOL/L (ref 3.4–5.3)
SODIUM SERPL-SCNC: 136 MMOL/L (ref 135–145)
TRIGL SERPL-MCNC: 278 MG/DL
TSH SERPL DL<=0.005 MIU/L-ACNC: 6.8 UIU/ML (ref 0.3–4.2)

## 2024-06-27 ENCOUNTER — OFFICE VISIT (OUTPATIENT)
Dept: FAMILY MEDICINE | Facility: CLINIC | Age: 39
End: 2024-06-27
Payer: COMMERCIAL

## 2024-06-27 VITALS
OXYGEN SATURATION: 100 % | HEART RATE: 89 BPM | TEMPERATURE: 97.4 F | WEIGHT: 226.8 LBS | BODY MASS INDEX: 33.59 KG/M2 | HEIGHT: 69 IN | DIASTOLIC BLOOD PRESSURE: 84 MMHG | RESPIRATION RATE: 16 BRPM | SYSTOLIC BLOOD PRESSURE: 160 MMHG

## 2024-06-27 DIAGNOSIS — E11.65 TYPE 2 DIABETES MELLITUS WITH HYPERGLYCEMIA, WITHOUT LONG-TERM CURRENT USE OF INSULIN (H): Primary | ICD-10-CM

## 2024-06-27 DIAGNOSIS — E03.4 HYPOTHYROIDISM DUE TO ACQUIRED ATROPHY OF THYROID: ICD-10-CM

## 2024-06-27 DIAGNOSIS — Z28.21 VACCINATION NOT CARRIED OUT BECAUSE OF PATIENT REFUSAL: ICD-10-CM

## 2024-06-27 DIAGNOSIS — I10 ESSENTIAL HYPERTENSION WITH GOAL BLOOD PRESSURE LESS THAN 140/90: ICD-10-CM

## 2024-06-27 PROCEDURE — 99207 PR FOOT EXAM NO CHARGE: CPT | Performed by: FAMILY MEDICINE

## 2024-06-27 PROCEDURE — G2211 COMPLEX E/M VISIT ADD ON: HCPCS | Performed by: FAMILY MEDICINE

## 2024-06-27 PROCEDURE — 99215 OFFICE O/P EST HI 40 MIN: CPT | Performed by: FAMILY MEDICINE

## 2024-06-27 RX ORDER — AMLODIPINE BESYLATE 10 MG/1
10 TABLET ORAL DAILY
Qty: 90 TABLET | Refills: 1 | Status: SHIPPED | OUTPATIENT
Start: 2024-06-27

## 2024-06-27 ASSESSMENT — PAIN SCALES - GENERAL: PAINLEVEL: NO PAIN (0)

## 2024-06-27 NOTE — PROGRESS NOTES
"  Assessment & Plan     (E11.65) Type 2 diabetes mellitus with hyperglycemia, without long-term current use of insulin (H)  (primary encounter diagnosis)  Comment: Uncontrolled due to hyperglycemia as evidence by his estimated average glucose greater than 190 based on his HgbA1c. Unfortunately, the patient and I disagree on his diabetes treatment. He feels that he can improve his diabetes \"naturally\" (without medication), although he has not been able to do this over the past 12 months. In addition he has not been willing to take his statin despite its indication in diabetes. He is willing to reconsider these points at next visit if he is not able to improve his HgbA1c and lipid levels by his next visit.   Plan: Adult Eye  Referral, Lipid panel         reflex to direct LDL Non-fasting, Hemoglobin         A1c, FOOT EXAM        I reviewed his HgbA1c history and the components of his lipid panel. Return in about 3 months (around 9/27/2024) for cholesterol, diabetes, blood pressure check.      (E03.4) Hypothyroidism due to acquired atrophy of thyroid  Comment: As discussed last year, he is not interested in taking thyroid replacement to make him euthyroid. I think that taking levothyroxine will make it easier for him to control his diabetes.   Plan: TSH        Future lab ordered so that we can address this again at his next visit. Return in about 3 months (around 9/27/2024) for cholesterol, diabetes, blood pressure check.      (I10) Essential hypertension with goal blood pressure less than 140/90  Comment: Uncontrolled, although the patient reports BPs at goal much or most of the time at home. I am not sure why he is not taking an ACEI.   Plan: amLODIPine (NORVASC) 10 MG tablet        Return in about 3 months (around 9/27/2024) for cholesterol, diabetes, blood pressure check.      (Z28.21) Vaccination not carried out because of patient refusal  Comment: COVID-19 and Hep-B  Plan:           Subjective   Zach is " a 38 year old, presenting for the following health issues:  Hypertension, Diabetes, and Lipids        6/27/2024     3:17 PM   Additional Questions   Roomed by Nelda   Accompanied by self     History of Present Illness       Diabetes:   He presents for follow up of diabetes.  He is checking home blood glucose two times daily.   He checks blood glucose before and after meals.  Blood glucose is sometimes over 200 and never under 70.  When his blood glucose is low, the patient is asymptomatic for confusion, blurred vision, lethargy and reports not feeling dizzy, shaky, or weak.   He has no concerns regarding his diabetes at this time.   He is not experiencing numbness or burning in feet, excessive thirst, blurry vision, weight changes or redness, sores or blisters on feet. The patient has had a diabetic eye exam in the last 12 months. Eye exam performed on 2023. Location of last eye exam at this hospital.        Hyperlipidemia:  He presents for follow up of hyperlipidemia.   He is not taking medication to lower cholesterol. He is not having myalgia or other side effects to statin medications.    Hypertension: He presents for follow up of hypertension.  He does check blood pressure  regularly outside of the clinic. Outside blood pressures have been over 140/90. He does not follow a low salt diet.     He eats 0-1 servings of fruits and vegetables daily.He consumes 0 sweetened beverage(s) daily.He exercises with enough effort to increase his heart rate 30 to 60 minutes per day.  He exercises with enough effort to increase his heart rate 7 days per week.   He is taking medications regularly.     Patient reports average morning blood sugar of 150 and bedtime BS in the 100s. After eating, it is sometimes as high as 300. He was eating peanuts for a while which he states increased his BS readings and he stopped eating them.       Review of Systems       Objective    BP (!) 160/84 (BP Location: Right arm, Patient Position:  "Chair, Cuff Size: Adult Regular)   Pulse 89   Temp 97.4  F (36.3  C) (Temporal)   Resp 16   Ht 1.753 m (5' 9.02\")   Wt 102.9 kg (226 lb 12.8 oz)   SpO2 100%   BMI 33.48 kg/m    Body mass index is 33.48 kg/m .  Physical Exam   GENERAL: healthy, alert and no distress  EYES: Eyes grossly normal to inspection, PERRL, EOMI, sclerae white and conjunctivae normal  RESP: lungs clear to auscultation - no crackles or wheezes, no areas of dullness, no tachypnea  CV: Heart regular rate and rhythm without murmur, click or rub. No peripheral edema and peripheral pulses strong  MS: no gross musculoskeletal defects noted, no edema  SKIN: no suspicious lesions or rashes to visible skin  NEURO: Normal strength and tone, sensory exam grossly normal, mentation intact, oriented times 3 and cranial nerves 2-12 intact  PSYCH: mentation appears normal, affect normal/bright  Diabetic foot exam: normal DP and PT pulses, no trophic changes or ulcerative lesions, normal sensory exam, and normal monofilament exam     Lab on 06/25/2024   Component Date Value Ref Range Status    Cholesterol 06/25/2024 199  <200 mg/dL Final    Triglycerides 06/25/2024 278 (H)  <150 mg/dL Final    Direct Measure HDL 06/25/2024 33 (L)  >=40 mg/dL Final    LDL Cholesterol Calculated 06/25/2024 110 (H)  <=100 mg/dL Final    Non HDL Cholesterol 06/25/2024 166 (H)  <130 mg/dL Final    Patient Fasting > 8hrs? 06/25/2024 No   Final    ALT 06/25/2024 44  0 - 70 U/L Final    Glucose Whole Blood 06/25/2024 219 (H)  60 - 99 mg/dL Final    Hemoglobin A1C 06/25/2024 8.5 (H)  0.0 - 5.6 % Final    Normal <5.7%   Prediabetes 5.7-6.4%    Diabetes 6.5% or higher     Note: Adopted from ADA consensus guidelines.    TSH 06/25/2024 6.80 (H)  0.30 - 4.20 uIU/mL Final    Sodium 06/25/2024 136  135 - 145 mmol/L Final    Potassium 06/25/2024 4.0  3.4 - 5.3 mmol/L Final    Chloride 06/25/2024 101  98 - 107 mmol/L Final    Carbon Dioxide (CO2) 06/25/2024 24  22 - 29 mmol/L Final    " Anion Gap 06/25/2024 11  7 - 15 mmol/L Final    Urea Nitrogen 06/25/2024 16.5  6.0 - 20.0 mg/dL Final    Creatinine 06/25/2024 1.20 (H)  0.67 - 1.17 mg/dL Final    GFR Estimate 06/25/2024 78  >60 mL/min/1.73m2 Final    eGFR calculated using 2021 CKD-EPI equation.    Calcium 06/25/2024 9.2  8.6 - 10.0 mg/dL Final    Glucose 06/25/2024 241 (H)  70 - 99 mg/dL Final    Patient Fasting > 8hrs? 06/25/2024 No   Final    Creatinine Urine mg/dL 06/25/2024 149.0  mg/dL Final    The reference ranges have not been established in urine creatinine. The results should be integrated into the clinical context for interpretation.    Albumin Urine mg/L 06/25/2024 <12.0  mg/L Final    The reference ranges have not been established in urine albumin. The results should be integrated into the clinical context for interpretation.    Albumin Urine mg/g Cr 06/25/2024    Final    Unable to calculate, urine albumin and/or urine creatinine is outside detectable limits.  Microalbuminuria is defined as an albumin:creatinine ratio of 17 to 299 for males and 25 to 299 for females. A ratio of albumin:creatinine of 300 or higher is indicative of overt proteinuria.  Due to biologic variability, positive results should be confirmed by a second, first-morning random or 24-hour timed urine specimen. If there is discrepancy, a third specimen is recommended. When 2 out of 3 results are in the microalbuminuria range, this is evidence for incipient nephropathy and warrants increased efforts at glucose control, blood pressure control, and institution of therapy with an angiotensin-converting-enzyme (ACE) inhibitor (if the patient can tolerate it).               42 minutes spent by me on the date of the encounter doing chart review, review of test results, interpretation of tests, patient visit, and documentation 34 minutes spent with the patient in the exam room.       Signed Electronically by: Catrachito Echeverria MD      The information in this document,  created by the medical scribe for me, accurately reflects the services I personally performed and the decisions made by me. I have reviewed and approved this document for accuracy prior to signature.  Deonte Day

## 2024-07-28 ENCOUNTER — HEALTH MAINTENANCE LETTER (OUTPATIENT)
Age: 39
End: 2024-07-28

## 2024-09-06 DIAGNOSIS — E11.65 TYPE 2 DIABETES MELLITUS WITH HYPERGLYCEMIA, WITHOUT LONG-TERM CURRENT USE OF INSULIN (H): ICD-10-CM

## 2024-09-06 RX ORDER — BLOOD SUGAR DIAGNOSTIC
STRIP MISCELLANEOUS
Qty: 200 STRIP | Refills: 2 | Status: SHIPPED | OUTPATIENT
Start: 2024-09-06

## 2024-10-06 ENCOUNTER — HEALTH MAINTENANCE LETTER (OUTPATIENT)
Age: 39
End: 2024-10-06

## 2024-10-13 ENCOUNTER — LAB (OUTPATIENT)
Dept: LAB | Facility: CLINIC | Age: 39
End: 2024-10-13
Payer: COMMERCIAL

## 2024-10-13 DIAGNOSIS — E11.65 TYPE 2 DIABETES MELLITUS WITH HYPERGLYCEMIA, WITHOUT LONG-TERM CURRENT USE OF INSULIN (H): ICD-10-CM

## 2024-10-13 DIAGNOSIS — E03.4 HYPOTHYROIDISM DUE TO ACQUIRED ATROPHY OF THYROID: ICD-10-CM

## 2024-10-13 LAB
CHOLEST SERPL-MCNC: 232 MG/DL
EST. AVERAGE GLUCOSE BLD GHB EST-MCNC: 209 MG/DL
FASTING STATUS PATIENT QL REPORTED: YES
HBA1C MFR BLD: 8.9 % (ref 0–5.6)
HDLC SERPL-MCNC: 38 MG/DL
LDLC SERPL CALC-MCNC: 166 MG/DL
NONHDLC SERPL-MCNC: 194 MG/DL
TRIGL SERPL-MCNC: 141 MG/DL
TSH SERPL DL<=0.005 MIU/L-ACNC: 7.69 UIU/ML (ref 0.3–4.2)

## 2024-10-13 PROCEDURE — 83036 HEMOGLOBIN GLYCOSYLATED A1C: CPT

## 2024-10-13 PROCEDURE — 36415 COLL VENOUS BLD VENIPUNCTURE: CPT

## 2024-10-13 PROCEDURE — 80061 LIPID PANEL: CPT

## 2024-10-13 PROCEDURE — 84443 ASSAY THYROID STIM HORMONE: CPT

## 2024-10-14 ENCOUNTER — OFFICE VISIT (OUTPATIENT)
Dept: FAMILY MEDICINE | Facility: CLINIC | Age: 39
End: 2024-10-14
Payer: COMMERCIAL

## 2024-10-14 VITALS
HEART RATE: 107 BPM | WEIGHT: 231 LBS | OXYGEN SATURATION: 100 % | HEIGHT: 69 IN | RESPIRATION RATE: 20 BRPM | BODY MASS INDEX: 34.21 KG/M2 | SYSTOLIC BLOOD PRESSURE: 158 MMHG | TEMPERATURE: 98.1 F | DIASTOLIC BLOOD PRESSURE: 92 MMHG

## 2024-10-14 DIAGNOSIS — I10 ESSENTIAL HYPERTENSION WITH GOAL BLOOD PRESSURE LESS THAN 140/90: ICD-10-CM

## 2024-10-14 DIAGNOSIS — E03.4 HYPOTHYROIDISM DUE TO ACQUIRED ATROPHY OF THYROID: ICD-10-CM

## 2024-10-14 DIAGNOSIS — E11.65 TYPE 2 DIABETES MELLITUS WITH HYPERGLYCEMIA, WITHOUT LONG-TERM CURRENT USE OF INSULIN (H): Primary | ICD-10-CM

## 2024-10-14 DIAGNOSIS — E78.5 HYPERLIPIDEMIA LDL GOAL <100: ICD-10-CM

## 2024-10-14 PROBLEM — E66.811 OBESITY, CLASS I, BMI 30-34.9: Status: ACTIVE | Noted: 2024-10-14

## 2024-10-14 LAB — GLUCOSE BLD-MCNC: 121 MG/DL (ref 60–99)

## 2024-10-14 PROCEDURE — 36415 COLL VENOUS BLD VENIPUNCTURE: CPT | Performed by: FAMILY MEDICINE

## 2024-10-14 PROCEDURE — 82947 ASSAY GLUCOSE BLOOD QUANT: CPT | Performed by: FAMILY MEDICINE

## 2024-10-14 PROCEDURE — 99214 OFFICE O/P EST MOD 30 MIN: CPT | Performed by: FAMILY MEDICINE

## 2024-10-14 PROCEDURE — 82947 ASSAY GLUCOSE BLOOD QUANT: CPT | Mod: 59 | Performed by: FAMILY MEDICINE

## 2024-10-14 RX ORDER — AMLODIPINE BESYLATE 5 MG/1
5 TABLET ORAL DAILY
Qty: 90 TABLET | Refills: 0 | Status: SHIPPED | OUTPATIENT
Start: 2024-10-14

## 2024-10-14 RX ORDER — ROSUVASTATIN CALCIUM 20 MG/1
20 TABLET, COATED ORAL DAILY
Qty: 90 TABLET | Refills: 0 | Status: CANCELLED | OUTPATIENT
Start: 2024-10-14

## 2024-10-14 RX ORDER — LANCETS 33 GAUGE
1 EACH MISCELLANEOUS 4 TIMES DAILY
Qty: 200 EACH | Refills: 5 | Status: SHIPPED | OUTPATIENT
Start: 2024-10-14

## 2024-10-14 RX ORDER — BLOOD SUGAR DIAGNOSTIC
STRIP MISCELLANEOUS
Qty: 200 STRIP | Refills: 5 | Status: SHIPPED | OUTPATIENT
Start: 2024-10-14

## 2024-10-14 RX ORDER — METFORMIN HYDROCHLORIDE 500 MG/1
500 TABLET, EXTENDED RELEASE ORAL
Qty: 90 TABLET | Refills: 0 | Status: CANCELLED | OUTPATIENT
Start: 2024-10-14

## 2024-10-14 NOTE — PROGRESS NOTES
Assessment & Plan     (E11.65) Type 2 diabetes mellitus with hyperglycemia, without long-term current use of insulin (H)  (primary encounter diagnosis)  Comment: Uncontrolled, and once again the patient is not convinced that taking more medication will benefit him, as he thinks he can make adjustments on his own.  We have demonstrated that his glucose meter is accurate, and therefore his elevated hemoglobin A1c means he is having high glucose readings at times of the day that he is not measuring.  Plan: Glucose whole blood, Glucose, blood glucose         (ONETOUCH ULTRA) test strip, Lancets (ONETOUCH         DELICA PLUS [id number]) MISC        Assuming it is covered by his insurance, he CGM is expected to help him with feedback as he makes further attempts at diet changes until his next visit. Return in about 10 weeks (around 12/23/2024) for full physical, diabetes, cholesterol, blood pressure check.      (I10) Essential hypertension with goal blood pressure less than 140/90  Comment: Uncontrolled.  I would rather increase his medication, including adding an ACE inhibitor  Plan: amLODIPine (NORVASC) 5 MG tablet        The patient is chosen to decrease amlodipine to decrease possible side effects, and he hopes to improve blood pressure on his own through lifestyle changes.  Recheck at next visit    (E78.5) Hyperlipidemia LDL goal <100  Comment: Not at goal, and his LDL is actually higher now than 4 months ago.  See my discussion in the 6/27/2024 note  Plan: I reminded the patient of the indication for statin use in diabetics, but he still is not willing to do this.  He has an active prescription and can restart when he changes his mind.  We can address this at the next visit    (E03.4) Hypothyroidism due to acquired atrophy of thyroid  Comment: Mild persistent elevations of TSH noted  Plan: I advised the patient of the significance of this, pointing out that hypothyroidism can contribute to difficulty in treating  diabetes, worsening lipid levels, etc.  Handout provided.  We can readdress this at the next visit as well, but he does not want to start a new medicine yet.            Agustin Serrano is a 38 year old, presenting for the following health issues:  Diabetes        10/14/2024     1:45 PM   Additional Questions   Roomed by Yaritza   Accompanied by Self     History of Present Illness       Diabetes:   He presents for follow up of diabetes.  He is checking home blood glucose two times daily.   He checks blood glucose before and after meals.  Blood glucose is sometimes over 200 and never under 70.  When his blood glucose is low, the patient is asymptomatic for confusion, blurred vision, lethargy and reports not feeling dizzy, shaky, or weak.   He has no concerns regarding his diabetes at this time.   He is not experiencing numbness or burning in feet, excessive thirst, blurry vision, weight changes or redness, sores or blisters on feet. The patient has had a diabetic eye exam in the last 12 months. Eye exam performed on 2023. Location of last eye exam [facility].        He eats 0-1 servings of fruits and vegetables daily.He consumes 0 sweetened beverage(s) daily.He exercises with enough effort to increase his heart rate 30 to 60 minutes per day.  He exercises with enough effort to increase his heart rate 7 days per week. He is missing 7 dose(s) of medications per week.  He is not taking prescribed medications regularly due to other.     Typically checks glc about 2-3 times per day (morning, about 2 pm, bedtime), and gets readings of 140-160, rarely sees a glc >200.      Hypertension Follow-up    Do you check your blood pressure regularly outside of the clinic? Yes   Are you following a low salt diet? Yes  Are your blood pressures ever more than 140 on the top number (systolic) OR more   than 90 on the bottom number (diastolic), for example 140/90? Tends to get SBP in 140s-150s            Objective    BP (!) 158/92 (BP  "Location: Left arm, Patient Position: Sitting, Cuff Size: Adult Large)   Pulse 107   Temp 98.1  F (36.7  C) (Temporal)   Resp 20   Ht 1.753 m (5' 9\")   Wt 104.8 kg (231 lb)   SpO2 100%   BMI 34.11 kg/m    Body mass index is 34.11 kg/m .  Physical Exam   GENERAL: healthy, alert and no distress  EYES: Eyes grossly normal to inspection, PERRL, EOMI, sclerae white and conjunctivae normal  MS: no gross musculoskeletal defects noted, no edema  SKIN: no suspicious lesions or rashes to visible skin  NEURO: Normal strength and tone, sensory exam grossly normal, mentation intact, oriented times 3 and cranial nerves 2-12 intact  PSYCH: mentation appears normal, affect normal/bright     Lab on 10/13/2024   Component Date Value Ref Range Status    Cholesterol 10/13/2024 232 (H)  <200 mg/dL Final    Triglycerides 10/13/2024 141  <150 mg/dL Final    Direct Measure HDL 10/13/2024 38 (L)  >=40 mg/dL Final    LDL Cholesterol Calculated 10/13/2024 166 (H)  <100 mg/dL Final    Non HDL Cholesterol 10/13/2024 194 (H)  <130 mg/dL Final    Patient Fasting > 8hrs? 10/13/2024 Yes   Final    TSH 10/13/2024 7.69 (H)  0.30 - 4.20 uIU/mL Final    Estimated Average Glucose 10/13/2024 209 (H)  <117 mg/dL Final    Hemoglobin A1C 10/13/2024 8.9 (H)  0.0 - 5.6 % Final    Normal <5.7%   Prediabetes 5.7-6.4%    Diabetes 6.5% or higher     Note: Adopted from ADA consensus guidelines.     Lab Results   Component Value Date    A1C 8.9 10/13/2024    A1C 8.5 06/25/2024    A1C 8.4 02/16/2024    A1C 9.2 06/10/2023    A1C 13.8 03/10/2023            Signed Electronically by: Catrachito Echeverria MD    "

## 2024-10-14 NOTE — PATIENT INSTRUCTIONS
At [facility], we strive to deliver an exceptional experience to you, every time we see you. If you receive a survey, please let us know what we are doing well and/or what we could improve upon, as we do value your feedback.  If you have MyChart, you can expect to receive results automatically within 24 hours of their completion.  Your provider will send a note interpreting your results as well.   If you do not have MyChart, you should receive your results in about a week by mail.    Your care team:                            Family Medicine Internal Medicine   MD Luis Ko, MD Yuliya Merlos, MD Fabrizio Escalante, MD Genet Miles, PAConnerC    Joaquim Malagon, MD Pediatrics   Janay Wiseman, MD Theresa Moulton, MD Gina Whitmore, APRN CNP Jeannie Mcintyre APRN CNP   MD Brooklyn Yadav, MD Haley Mcgarry, CNP     Jericho Dominguez, CNP Same-Day Provider (No follow-up visits)   VALENTINA Li, DNP Kim Bryant, VALENTINA Albert, FNP, BC FAHAD MarrufoC     Clinic hours: Monday - Thursday 7 am-6 pm; Fridays 7 am-5 pm.   Urgent care: Monday - Friday 10 am- 8 pm; Saturday and Sunday 9 am-5 pm.    Clinic: [phone]       [facility]: Monday - Thursday 8 am - 7 pm; Friday 8 am - 6 pm  [facility]: [phone]

## 2024-10-15 LAB
FASTING STATUS PATIENT QL REPORTED: NO
GLUCOSE SERPL-MCNC: 136 MG/DL (ref 70–99)

## 2024-12-03 DIAGNOSIS — I10 ESSENTIAL HYPERTENSION WITH GOAL BLOOD PRESSURE LESS THAN 140/90: ICD-10-CM

## 2024-12-03 RX ORDER — AMLODIPINE BESYLATE 5 MG/1
5 TABLET ORAL DAILY
Qty: 90 TABLET | Refills: 0 | Status: SHIPPED | OUTPATIENT
Start: 2024-12-03

## 2024-12-03 RX ORDER — AMLODIPINE BESYLATE 5 MG/1
5 TABLET ORAL DAILY
Qty: 90 TABLET | Refills: 0 | OUTPATIENT
Start: 2024-12-03

## 2024-12-03 NOTE — TELEPHONE ENCOUNTER
90 day supply was sent on 10/14 with no refills.     Pt needs new prescription sent to pharmacy. Rx request was denied by refill RN.     Routing to provider to send new prescription. RN unable to route encounter with med pended.    Dacia Valdovinos RN

## 2024-12-03 NOTE — TELEPHONE ENCOUNTER
Called patient and relayed provider message below, patient verbalized understanding.     Writer also called pharmacy to make sure they don't need anything else - pharmacy states as long as new script was sent over, no other action needed.    Will close encounter.      Akilah Hutson RN, BSN  [facility]

## 2025-01-19 ENCOUNTER — HEALTH MAINTENANCE LETTER (OUTPATIENT)
Age: 40
End: 2025-01-19

## 2025-03-09 DIAGNOSIS — I10 ESSENTIAL HYPERTENSION WITH GOAL BLOOD PRESSURE LESS THAN 140/90: ICD-10-CM

## 2025-03-10 ENCOUNTER — TELEPHONE (OUTPATIENT)
Dept: FAMILY MEDICINE | Facility: CLINIC | Age: 40
End: 2025-03-10
Payer: COMMERCIAL

## 2025-03-10 NOTE — TELEPHONE ENCOUNTER
"amLODIPine (NORVASC) 5 MG tablet [Pharmacy Med Name: AMLODIPINE BESYLATE 5 MG TAB] 90 tablet         \"Patient requests new RX: patient increase his dose want be in on 10MG AMLODIPINE again.  Please send new RX ASAP!!\"  "

## 2025-03-11 NOTE — TELEPHONE ENCOUNTER
Per chart review patient used to be on this dose in the past.  He wanted to decrease the dose do to side effects.  At last OV, BP was not controlled with lower dose.    Pls send amlodipine 10 mg per patient.  Pily Rogers RN, BSN  [facility]

## 2025-03-12 NOTE — TELEPHONE ENCOUNTER
Patient is calling on this again, patient is running out of medication.Ly Bustamante [facility]

## 2025-03-13 RX ORDER — AMLODIPINE BESYLATE 5 MG/1
5 TABLET ORAL DAILY
Qty: 90 TABLET | Refills: 0 | Status: SHIPPED | OUTPATIENT
Start: 2025-03-13

## 2025-03-16 ENCOUNTER — OFFICE VISIT (OUTPATIENT)
Dept: URGENT CARE | Facility: URGENT CARE | Age: 40
End: 2025-03-16
Payer: COMMERCIAL

## 2025-03-16 VITALS
HEART RATE: 91 BPM | TEMPERATURE: 98.4 F | DIASTOLIC BLOOD PRESSURE: 90 MMHG | SYSTOLIC BLOOD PRESSURE: 143 MMHG | RESPIRATION RATE: 20 BRPM | WEIGHT: 231 LBS | BODY MASS INDEX: 34.11 KG/M2 | OXYGEN SATURATION: 100 %

## 2025-03-16 DIAGNOSIS — E03.4 HYPOTHYROIDISM DUE TO ACQUIRED ATROPHY OF THYROID: ICD-10-CM

## 2025-03-16 DIAGNOSIS — I10 ESSENTIAL HYPERTENSION WITH GOAL BLOOD PRESSURE LESS THAN 140/90: Primary | ICD-10-CM

## 2025-03-16 DIAGNOSIS — E11.65 TYPE 2 DIABETES MELLITUS WITH HYPERGLYCEMIA, WITHOUT LONG-TERM CURRENT USE OF INSULIN (H): ICD-10-CM

## 2025-03-16 DIAGNOSIS — E66.811 OBESITY, CLASS I, BMI 30-34.9: ICD-10-CM

## 2025-03-16 DIAGNOSIS — E78.5 HYPERLIPIDEMIA LDL GOAL <100: ICD-10-CM

## 2025-03-16 PROCEDURE — 1126F AMNT PAIN NOTED NONE PRSNT: CPT | Performed by: NURSE PRACTITIONER

## 2025-03-16 PROCEDURE — 3079F DIAST BP 80-89 MM HG: CPT | Performed by: NURSE PRACTITIONER

## 2025-03-16 PROCEDURE — 99214 OFFICE O/P EST MOD 30 MIN: CPT | Performed by: NURSE PRACTITIONER

## 2025-03-16 PROCEDURE — 3077F SYST BP >= 140 MM HG: CPT | Performed by: NURSE PRACTITIONER

## 2025-03-16 ASSESSMENT — PAIN SCALES - GENERAL: PAINLEVEL_OUTOF10: NO PAIN (0)

## 2025-03-16 NOTE — PROGRESS NOTES
Assessment & Plan      Diagnosis Comments   1. Essential hypertension with goal blood pressure less than 140/90  I telephoned the pharmacist at [facility] who confirmed that patient had a prescription for Norvasc 5 mg 90-day supply we discussed he could increase his dosing to 10 mg using his prescription with continued close monitoring of blood pressure and any related symptoms with follow-up with his primary care provider within the next 45 days.  Patient was in agreement this plan verbalized understanding.      2. Type 2 diabetes mellitus with hyperglycemia, without long-term current use of insulin (H)  stable      3. Hyperlipidemia LDL goal <100  stable      4. Hypothyroidism due to acquired atrophy of thyroid  stable      5. Obesity, Class I, BMI 30-34.9  stable      30 minutes spent on the date of the encounter doing chart review, review of outside records, review of test results, interpretation of tests, patient visit, and discussion with other provider(s)         VALENTINA Moore [facility]    Agustin Serrano is a 39 year old male who presents to clinic today for the following health issues:  Chief Complaint   Patient presents with    Hypertension     Pt states that his PCP put him on Norvasc 5 mg and he wants his dosage moved to 10 mg         3/16/2025     2:03 PM   Additional Questions   Roomed by Colleen   Accompanied by self     HPI  Patient presents to clinic with concerns related to elevated blood pressure states that he was seen with his primary care provider and had decreased his Norvasc to 5 mg from 10 due to side effects related to sexual desire.  He then went on a trip to [place] and when he returned he started to recheck his blood pressure on a regular basis stating that the diastolic number has been over 100 on several occasions.  He has had several Ludi messages in epic requesting to have his Norvasc return to 10 mg.  His primary care provider did  refill Norvasc 5 mg tablets a 90-day supply patient has not picked this up yet.    We discussed symptoms such as chest pain, shortness of breath, headache patient denies days.    Review of most recent primary care note indicated that patient had chosen to decrease amlodipine on his own and was hoping that blood pressure would improve with lifestyle changes.     He also has a history of diabetes, hyperlipidemia, and hypothyroid.         Review of Systems  Constitutional, HEENT, cardiovascular, pulmonary, gi and gu systems are negative, except as otherwise noted.      Objective    BP (!) 143/90 (BP Location: Left arm, Patient Position: Sitting, Cuff Size: Adult Large)   Pulse 91   Temp 98.4  F (36.9  C) (Tympanic)   Resp 20   Wt 104.8 kg (231 lb)   SpO2 100%   BMI 34.11 kg/m    Physical Exam   GENERAL: alert and no distress  NECK: no adenopathy, no asymmetry, masses, or scars  RESP: lungs clear to auscultation - no rales, rhonchi or wheezes  CV: regular rate and rhythm, normal S1 S2, no S3 or S4, no murmur, click or rub, no peripheral edema  ABDOMEN: soft, nontender, no hepatosplenomegaly, no masses and bowel sounds normal  MS: no gross musculoskeletal defects noted, no edema

## 2025-04-27 ENCOUNTER — HEALTH MAINTENANCE LETTER (OUTPATIENT)
Age: 40
End: 2025-04-27

## 2025-04-28 ENCOUNTER — DOCUMENTATION ONLY (OUTPATIENT)
Dept: FAMILY MEDICINE | Facility: CLINIC | Age: 40
End: 2025-04-28
Payer: COMMERCIAL

## 2025-04-28 DIAGNOSIS — E11.65 TYPE 2 DIABETES MELLITUS WITH HYPERGLYCEMIA, WITHOUT LONG-TERM CURRENT USE OF INSULIN (H): Primary | ICD-10-CM

## 2025-04-28 DIAGNOSIS — I10 ESSENTIAL HYPERTENSION WITH GOAL BLOOD PRESSURE LESS THAN 140/90: ICD-10-CM

## 2025-04-28 DIAGNOSIS — E78.5 HYPERLIPIDEMIA LDL GOAL <100: ICD-10-CM

## 2025-04-28 DIAGNOSIS — E03.4 HYPOTHYROIDISM DUE TO ACQUIRED ATROPHY OF THYROID: ICD-10-CM

## 2025-04-28 NOTE — PROGRESS NOTES
Zach Allen has an upcoming lab appointment:    Future Appointments   Date Time Provider Department Center   5/3/2025  9:30 AM [facility]   5/12/2025  8:30 AM Catrachito Echeverria MD [facility]     Patient is scheduled for the following lab(s): pended    There is no order available. Please review and place either future orders or HMPO (Review of Health Maintenance Protocol Orders), as appropriate.    Health Maintenance Due   Topic    A1C     ANNUAL REVIEW OF HM ORDERS      Dolores Purvis

## 2025-05-03 ENCOUNTER — MYC MEDICAL ADVICE (OUTPATIENT)
Dept: FAMILY MEDICINE | Facility: CLINIC | Age: 40
End: 2025-05-03

## 2025-05-03 ENCOUNTER — LAB (OUTPATIENT)
Dept: LAB | Facility: CLINIC | Age: 40
End: 2025-05-03
Payer: COMMERCIAL

## 2025-05-03 DIAGNOSIS — E11.65 TYPE 2 DIABETES MELLITUS WITH HYPERGLYCEMIA, WITHOUT LONG-TERM CURRENT USE OF INSULIN (H): ICD-10-CM

## 2025-05-03 DIAGNOSIS — E03.4 HYPOTHYROIDISM DUE TO ACQUIRED ATROPHY OF THYROID: ICD-10-CM

## 2025-05-03 DIAGNOSIS — E78.5 HYPERLIPIDEMIA LDL GOAL <100: ICD-10-CM

## 2025-05-03 DIAGNOSIS — I10 ESSENTIAL HYPERTENSION WITH GOAL BLOOD PRESSURE LESS THAN 140/90: ICD-10-CM

## 2025-05-03 LAB
ALT SERPL W P-5'-P-CCNC: 33 U/L (ref 0–70)
CHOLEST SERPL-MCNC: 185 MG/DL
CREAT SERPL-MCNC: 1.19 MG/DL (ref 0.67–1.17)
CREAT UR-MCNC: 326 MG/DL
EGFRCR SERPLBLD CKD-EPI 2021: 79 ML/MIN/1.73M2
EST. AVERAGE GLUCOSE BLD GHB EST-MCNC: 209 MG/DL
FASTING STATUS PATIENT QL REPORTED: YES
HBA1C MFR BLD: 8.9 % (ref 0–5.6)
HDLC SERPL-MCNC: 35 MG/DL
LDLC SERPL CALC-MCNC: 128 MG/DL
MICROALBUMIN UR-MCNC: <12 MG/L
MICROALBUMIN/CREAT UR: NORMAL MG/G{CREAT}
NONHDLC SERPL-MCNC: 150 MG/DL
POTASSIUM SERPL-SCNC: 4.2 MMOL/L (ref 3.4–5.3)
TRIGL SERPL-MCNC: 108 MG/DL
TSH SERPL DL<=0.005 MIU/L-ACNC: 5.81 UIU/ML (ref 0.3–4.2)

## 2025-05-03 PROCEDURE — 82570 ASSAY OF URINE CREATININE: CPT

## 2025-05-03 PROCEDURE — 84443 ASSAY THYROID STIM HORMONE: CPT

## 2025-05-03 PROCEDURE — 82565 ASSAY OF CREATININE: CPT

## 2025-05-03 PROCEDURE — 80061 LIPID PANEL: CPT

## 2025-05-03 PROCEDURE — 82043 UR ALBUMIN QUANTITATIVE: CPT

## 2025-05-03 PROCEDURE — 36415 COLL VENOUS BLD VENIPUNCTURE: CPT

## 2025-05-03 PROCEDURE — 84460 ALANINE AMINO (ALT) (SGPT): CPT

## 2025-05-03 PROCEDURE — 83036 HEMOGLOBIN GLYCOSYLATED A1C: CPT

## 2025-05-03 PROCEDURE — 84132 ASSAY OF SERUM POTASSIUM: CPT

## 2025-05-12 ENCOUNTER — OFFICE VISIT (OUTPATIENT)
Dept: FAMILY MEDICINE | Facility: CLINIC | Age: 40
End: 2025-05-12
Payer: COMMERCIAL

## 2025-05-12 VITALS
OXYGEN SATURATION: 95 % | HEIGHT: 69 IN | RESPIRATION RATE: 18 BRPM | TEMPERATURE: 97.4 F | WEIGHT: 230 LBS | BODY MASS INDEX: 34.07 KG/M2 | DIASTOLIC BLOOD PRESSURE: 86 MMHG | SYSTOLIC BLOOD PRESSURE: 135 MMHG | HEART RATE: 87 BPM

## 2025-05-12 DIAGNOSIS — I10 ESSENTIAL HYPERTENSION WITH GOAL BLOOD PRESSURE LESS THAN 140/90: ICD-10-CM

## 2025-05-12 DIAGNOSIS — E11.65 TYPE 2 DIABETES MELLITUS WITH HYPERGLYCEMIA, WITHOUT LONG-TERM CURRENT USE OF INSULIN (H): Primary | ICD-10-CM

## 2025-05-12 DIAGNOSIS — R07.9 EXERTIONAL CHEST PAIN: ICD-10-CM

## 2025-05-12 DIAGNOSIS — E03.4 HYPOTHYROIDISM DUE TO ACQUIRED ATROPHY OF THYROID: ICD-10-CM

## 2025-05-12 PROCEDURE — 3075F SYST BP GE 130 - 139MM HG: CPT | Performed by: FAMILY MEDICINE

## 2025-05-12 PROCEDURE — 3079F DIAST BP 80-89 MM HG: CPT | Performed by: FAMILY MEDICINE

## 2025-05-12 PROCEDURE — 99214 OFFICE O/P EST MOD 30 MIN: CPT | Performed by: FAMILY MEDICINE

## 2025-05-12 RX ORDER — AMLODIPINE BESYLATE 5 MG/1
5 TABLET ORAL DAILY
Qty: 90 TABLET | Refills: 1 | Status: SHIPPED | OUTPATIENT
Start: 2025-05-12 | End: 2025-05-12

## 2025-05-12 RX ORDER — AMLODIPINE BESYLATE 10 MG/1
10 TABLET ORAL DAILY
Qty: 90 TABLET | Refills: 1 | Status: SHIPPED | OUTPATIENT
Start: 2025-05-12

## 2025-05-12 NOTE — PATIENT INSTRUCTIONS
Please call your clinic of choice to schedule your echocardiogram (heart ultrasound):    [facility]:   [phone]  [facility]:   [phone]  [facility]:  [phone] **  [facility]): [phone]  [facility]:   [phone]  [facility]:   [phone]  [facility] [place]):  [phone]  [facility] [phone]

## 2025-05-12 NOTE — PROGRESS NOTES
Assessment & Plan     (E11.65) Type 2 diabetes mellitus with hyperglycemia, without long-term current use of insulin (H)  (primary encounter diagnosis)  Comment: uncontrolled as usual. The patient is disappointed in the lack of change in his HgbA1c, and he questions the accuracy since the estimated average glucose (209) is greater than his typical pre-meal glucose levels (noted below).   Plan: Hemoglobin A1c, Glucose        I discussed with him the factors that can improve his insulin resistance: diet changes (which he is attempting), regular exercise (doing), weight loss (not interested), taking medication (he wants to avoid this), treating his hypothyroidism (see below). Since he feels he is doing everything right, he wants to recheck his HgbA1c in 3 months. Return in about 3 months (around 8/12/2025) for diabetes.      (I10) Essential hypertension with goal blood pressure less than 140/90  Comment: borderline controlled, and the patient prefers to be on 10 mg, so I have increased his dose.   Plan: amLODIPine (NORVASC) 10 MG tablet,               Increase amlodipine to 10 mg.     (E03.4) Hypothyroidism due to acquired atrophy of thyroid  Comment: Increased TSH the last 4 checks. I recommend that he is treated. Unfortunately the patient sees the TSH trending towards normal and therefore feels that it will somehow normalize on its own. He refuses treatment today.   Plan: Handout(s) provided     (R07.9) Exertional chest pain  Comment: right upper chest, relatively focal but otherwise has a vague quality, and the patient is an uncontrolled diabetic.   Plan: Echocardiogram Exercise Stress            Agustin Serrano is a 39 year old, presenting for the following health issues:  Diabetes        5/12/2025     8:30 AM   Additional Questions   Roomed by Emi     History of Present Illness       Diabetes:   He presents for follow up of diabetes.  He is checking home blood glucose four or more times daily.   He checks  "blood glucose before and after meals.  Blood glucose is sometimes over 200 and never under 70.  When his blood glucose is low, the patient is asymptomatic for confusion, blurred vision, lethargy and reports not feeling dizzy, shaky, or weak.   He has no concerns regarding his diabetes at this time.   He is not experiencing numbness or burning in feet, excessive thirst, blurry vision, weight changes or redness, sores or blisters on feet. The patient has had a diabetic eye exam in the last 12 months. Eye exam performed on 2023. [facility].        Hypertension: He presents for follow up of hypertension.  He does check blood pressure  regularly outside of the clinic. Outside blood pressures have been over 140/90. He does not follow a low salt diet.     He eats 2-3 servings of fruits and vegetables daily.He consumes 0 sweetened beverage(s) daily.He exercises with enough effort to increase his heart rate 30 to 60 minutes per day.  He exercises with enough effort to increase his heart rate 5 days per week.   He is taking medications regularly.        [id number] 124 are some of his at home blood sugar recordings.     Since February he reports he has been eating less food and exercising more. The goes to the gym 5 times a week and is only eating breakfast, fruit for lunch, and dinner.         Review of Systems  Patient reports feeling a pressure/gripping feeling in his right chest while doing extreme running or weight lifting.         Objective    /86   Pulse 87   Temp 97.4  F (36.3  C)   Resp 18   Ht 1.753 m (5' 9\")   Wt 104.3 kg (230 lb)   SpO2 95%   BMI 33.97 kg/m    Body mass index is 33.97 kg/m .  Physical Exam   GENERAL: healthy, alert and no distress  EYES: Eyes grossly normal to inspection, PERRL, EOMI, sclerae white and conjunctivae normal  MS: no gross musculoskeletal defects noted, no edema  SKIN: no suspicious lesions or rashes to visible skin  NEURO: Normal " strength and tone, sensory exam grossly normal, mentation intact, oriented times 3 and cranial nerves 2-12 intact  PSYCH: mentation appears normal, affect normal/bright      Latest Reference Range & Units 05/03/25 09:27   Potassium 3.4 - 5.3 mmol/L 4.2   Creatinine 0.67 - 1.17 mg/dL 1.19 (H)   GFR Estimate >60 mL/min/1.73m2 79   ALT 0 - 70 U/L 33   Albumin Urine mg/g Cr  See Comment   Albumin Urine mg/L mg/L <12.0   Cholesterol <200 mg/dL 185   Creatinine Urine mg/dL 326.0   Patient Fasting?  Yes   HDL Cholesterol >=40 mg/dL 35 (L)   Estimated Average Glucose <117 mg/dL 209 (H)   Hemoglobin A1C 0.0 - 5.6 % 8.9 (H)   LDL Cholesterol Calculated <100 mg/dL 128 (H)   Non HDL Cholesterol <130 mg/dL 150 (H)   Triglycerides <150 mg/dL 108   TSH 0.30 - 4.20 uIU/mL 5.81 (H)   (H): Data is abnormally high  (L): Data is abnormally low    Lab Results   Component Value Date    A1C 8.9 05/03/2025    A1C 8.9 10/13/2024    A1C 8.5 06/25/2024    A1C 8.4 02/16/2024    A1C 9.2 06/10/2023      This document serves as a record of the services and decisions personally performed and made by Dr. Echeverria. It was created on his behalf by Karen Monroy, a trained medical scribe. The creation of this document is based the provider's statements to the medical scribe.  Karen Monroy, 8:36 AM             Signed Electronically by: Catrachito Echeverria MD

## 2025-08-10 ENCOUNTER — HEALTH MAINTENANCE LETTER (OUTPATIENT)
Age: 40
End: 2025-08-10